# Patient Record
Sex: MALE | Race: WHITE | NOT HISPANIC OR LATINO | Employment: STUDENT | URBAN - NONMETROPOLITAN AREA
[De-identification: names, ages, dates, MRNs, and addresses within clinical notes are randomized per-mention and may not be internally consistent; named-entity substitution may affect disease eponyms.]

---

## 2017-01-27 ENCOUNTER — OFFICE VISIT (OUTPATIENT)
Dept: ORTHOPEDIC SURGERY | Facility: CLINIC | Age: 32
End: 2017-01-27

## 2017-01-27 VITALS — BODY MASS INDEX: 37.67 KG/M2 | HEIGHT: 67 IN | WEIGHT: 240 LBS

## 2017-01-27 DIAGNOSIS — M94.8X1 OTHER SPECIFIED DISORDERS OF CARTILAGE, SHOULDER: ICD-10-CM

## 2017-01-27 DIAGNOSIS — M75.101 ROTATOR CUFF SYNDROME OF RIGHT SHOULDER: Primary | ICD-10-CM

## 2017-01-27 PROCEDURE — 99213 OFFICE O/P EST LOW 20 MIN: CPT | Performed by: ORTHOPAEDIC SURGERY

## 2017-01-27 NOTE — MR AVS SNAPSHOT
Ricardo Avila   1/27/2017 10:10 AM   Office Visit    Dept Phone:  [phone]   Encounter #:  [id number]    Provider:  Isidro Bowens MD   Department:  [facility]                Your Full Care Plan              Your Updated Medication List          This list is accurate as of: 1/27/17 10:50 AM.  Always use your most recent med list.                * albuterol 108 (90 BASE) MCG/ACT inhaler   Commonly known as:  PROVENTIL HFA;VENTOLIN HFA   Inhale 2 puffs Every 6 (Six) Hours As Needed for wheezing or shortness of air.       * albuterol (2.5 MG/3ML) 0.083% nebulizer solution   Commonly known as:  PROVENTIL   Take 2.5 mg by nebulization Every 4 (Four) Hours As Needed for wheezing.       azithromycin 250 MG tablet   Commonly known as:  ZITHROMAX Z-HANS   Take 2 tablets the first day, then 1 tablet daily for 4 days.       naproxen 500 MG tablet   Commonly known as:  NAPROSYN       promethazine-dextromethorphan 6.25-15 MG/5ML syrup   Commonly known as:  PROMETHAZINE-DM   1-2 tsp po QHS prn cough       * Notice:  This list has 2 medication(s) that are the same as other medications prescribed for you. Read the directions carefully, and ask your doctor or other care provider to review them with you.            You Were Diagnosed With        Codes Comments    Rotator cuff syndrome of right shoulder    -  Primary ICD-10-CM: M75.101  ICD-9-CM: 726.10     Other specified disorders of cartilage, shoulder     ICD-10-CM: M94.8X1  ICD-9-CM: 733.99       Instructions     None    Patient Instructions History      Upcoming Appointments     Visit Type Date Time Department    FOLLOW UP 1/27/2017 10:10 AM [facility]      [employer] Signup     Our records indicate that you have an active [employer] account.    You can view your After Visit Summary by going to [email or web address] and logging in with your [employer] username and password.  If you  "don't have a [employer] username and password but a parent or guardian has access to your record, the parent or guardian should login with their own [employer] username and password and access your record to view the After Visit Summary.    If you have questions, you can email [email or web address] or call [phone] to talk to our [employer] staff.  Remember, [employer] is NOT to be used for urgent needs.  For medical emergencies, dial 911.               Other Info from Your Visit           Allergies     No Known Allergies      Reason for Visit     Right Shoulder - Follow-up           Vital Signs     Height Weight Body Mass Index Smoking Status          67\" (170.2 cm) 240 lb (109 kg) 37.59 kg/m2 Former Smoker        Problems and Diagnoses Noted     Other specified disorders of cartilage, shoulder    Rotator cuff syndrome        "

## 2017-01-27 NOTE — PROGRESS NOTES
"Ricardo Avila is a 31 y.o. male returns for     Chief Complaint   Patient presents with   • Right Shoulder - Follow-up          CONCURRENT MEDICAL HISTORY: patient states that he is having more pain in shoulder that started about one month ago while at work he felt something \"tear\" in shoulder.     States he felt a pop in his shoulder about one month ago.  Worsening pain, with occasional popping in the right shoulder.  States he has not been able to lift the shoulder since the injury.    Past Medical History   Diagnosis Date   • Bronchitis, chronic    • Heart murmur    • History of ear infections    • History of strep sore throat    • Sinusitis        No Known Allergies      Current Outpatient Prescriptions:   •  albuterol (PROVENTIL HFA;VENTOLIN HFA) 108 (90 BASE) MCG/ACT inhaler, Inhale 2 puffs Every 6 (Six) Hours As Needed for wheezing or shortness of air., Disp: 18 g, Rfl: 0  •  albuterol (PROVENTIL) (2.5 MG/3ML) 0.083% nebulizer solution, Take 2.5 mg by nebulization Every 4 (Four) Hours As Needed for wheezing., Disp: 75 mL, Rfl: 0  •  naproxen (NAPROSYN) 500 MG tablet, Take 500 mg by mouth 2 (Two) Times a Day With Meals., Disp: , Rfl:   •  azithromycin (ZITHROMAX Z-HANS) 250 MG tablet, Take 2 tablets the first day, then 1 tablet daily for 4 days., Disp: 6 tablet, Rfl: 0  •  promethazine-dextromethorphan (PROMETHAZINE-DM) 6.25-15 MG/5ML syrup, 1-2 tsp po QHS prn cough, Disp: 120 mL, Rfl: 0    Past Surgical History   Procedure Laterality Date   • Shoulder surgery Right        ROS  No fevers or chills.  No chest pain or shortness of air.  No GI or  disturbances.    PHYSICAL EXAMINATION:       Visit Vitals   • Ht 67\" (170.2 cm)   • Wt 240 lb (109 kg)   • BMI 37.59 kg/m2       Physical Exam    GAIT:     [x]  Normal  []  Antalgic    Assistive device: [x]  None  []  Walker     []  Crutches  []  Cane     []  Wheelchair  []  Stretcher    Right Shoulder Exam     Tenderness   The patient is experiencing tenderness in " the acromion.    Range of Motion   Active Abduction: 120   Passive Abduction: 150   Forward Flexion: 150     Tests   Apprehension: negative  Hawkin's test: negative  Impingement: negative    Other   Erythema: absent  Scars: present  Sensation: normal  Pulse: present        pain overlying the acromion laterally.      No results found.          ASSESSMENT:    Diagnoses and all orders for this visit:    Rotator cuff syndrome of right shoulder  -     MRI shoulder right wo contrast; Future  -     FL Arthrogram Shoulder Right; Future    Other specified disorders of cartilage, shoulder  -     MRI shoulder right wo contrast; Future  -     FL Arthrogram Shoulder Right; Future        PLAN    Repeat MRI of the right shoulder with arthrogram.        Isidro Bowens MD

## 2017-03-10 ENCOUNTER — OFFICE VISIT (OUTPATIENT)
Dept: ORTHOPEDIC SURGERY | Facility: CLINIC | Age: 32
End: 2017-03-10

## 2017-03-10 VITALS — BODY MASS INDEX: 37.67 KG/M2 | HEIGHT: 67 IN | WEIGHT: 240 LBS

## 2017-03-10 DIAGNOSIS — S43.431A SLAP LESION OF RIGHT SHOULDER: ICD-10-CM

## 2017-03-10 DIAGNOSIS — M94.8X1 OTHER SPECIFIED DISORDERS OF CARTILAGE, SHOULDER: ICD-10-CM

## 2017-03-10 DIAGNOSIS — M75.101 ROTATOR CUFF SYNDROME OF RIGHT SHOULDER: Primary | ICD-10-CM

## 2017-03-10 PROCEDURE — 99213 OFFICE O/P EST LOW 20 MIN: CPT | Performed by: ORTHOPAEDIC SURGERY

## 2017-03-10 RX ORDER — NABUMETONE 750 MG/1
750 TABLET, FILM COATED ORAL 2 TIMES DAILY
Qty: 60 TABLET | Refills: 2 | Status: SHIPPED | OUTPATIENT
Start: 2017-03-10 | End: 2018-10-05

## 2017-03-10 NOTE — PROGRESS NOTES
"Ricardo Avila is a 31 y.o. 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          CONCURRENT MEDICAL HISTORY:    Past Medical History   Diagnosis Date   • Bronchitis, chronic    • Heart murmur    • History of ear infections    • History of strep sore throat    • Sinusitis        No Known Allergies      Current Outpatient Prescriptions:   •  albuterol (PROVENTIL HFA;VENTOLIN HFA) 108 (90 BASE) MCG/ACT inhaler, Inhale 2 puffs Every 6 (Six) Hours As Needed for wheezing or shortness of air., Disp: 18 g, Rfl: 0  •  albuterol (PROVENTIL) (2.5 MG/3ML) 0.083% nebulizer solution, Take 2.5 mg by nebulization Every 4 (Four) Hours As Needed for wheezing., Disp: 75 mL, Rfl: 0  •  azithromycin (ZITHROMAX Z-HANS) 250 MG tablet, Take 2 tablets the first day, then 1 tablet daily for 4 days., Disp: 6 tablet, Rfl: 0  •  nabumetone (RELAFEN) 750 MG tablet, Take 1 tablet by mouth 2 (Two) Times a Day., Disp: 60 tablet, Rfl: 2  •  naproxen (NAPROSYN) 500 MG tablet, Take 500 mg by mouth 2 (Two) Times a Day With Meals., Disp: , Rfl:   •  promethazine-dextromethorphan (PROMETHAZINE-DM) 6.25-15 MG/5ML syrup, 1-2 tsp po QHS prn cough, Disp: 120 mL, Rfl: 0    Past Surgical History   Procedure Laterality Date   • Shoulder surgery Right        ROS  No fevers or chills.  No chest pain or shortness of air.  No GI or  disturbances.    PHYSICAL EXAMINATION:       Visit Vitals   • Ht 67\" (170.2 cm)   • Wt 240 lb (109 kg)   • BMI 37.59 kg/m2       Physical Exam    GAIT:     []  Normal  []  Antalgic    Assistive device: []  None  []  Walker     []  Crutches  []  Cane     []  Wheelchair  []  Stretcher    Right Shoulder Exam     Tenderness   The patient is experiencing " tenderness in the acromioclavicular joint.    Range of Motion   Active Abduction: 150   Passive Abduction: 150   External Rotation: 30     Muscle Strength   Abduction: 5/5   Internal Rotation: 5/5   External Rotation: 5/5   Supraspinatus: 5/5   Biceps: 5/5     Tests   Apprehension: negative  Hawkin's test: negative    Other   Erythema: absent  Scars: absent  Sensation: normal  Pulse: present    Comments:  No swelling, no deformity              MRI shoulder right arthrogram with contrast2/10/2017  [facility]  Result Impression       1.  No labral abnormalities.    2.  Mild supraspinatus tendinosis.   Result Narrative   Indication:  Pain with decreased range of motion for 2-3 weeks; rotator cuff tear in the past.    MRI, Right Shoulder following Arthrogram:  No marrow edema or sign of bone injury is seen.  There is discrete subchondral signal change in the greater tuberosity that is probably from old injury.  The rotator cuff is not torn.  The supraspinatus tendon   is mildly thickened and has increased signal on T1-weighted images.  No impingement.  The labrum is intact.  No loose bodies.               ASSESSMENT:    There are no diagnoses linked to this encounter.      PLAN    Reviewed MRI findings.  Unclear what the cause of persistent symptoms is at this point.  Recommend 2nd opinion.  I discussed possibly returning to surgery for a repeat arthroscopy, and will withold making a decision until further time passes.    Will try relafen for inflammatory symptoms.    Isidro Bowens MD

## 2017-03-21 ENCOUNTER — TRANSCRIBE ORDERS (OUTPATIENT)
Dept: PHYSICAL THERAPY | Facility: HOSPITAL | Age: 32
End: 2017-03-21

## 2017-03-21 DIAGNOSIS — S16.1XXA NECK STRAIN, INITIAL ENCOUNTER: Primary | ICD-10-CM

## 2017-03-24 ENCOUNTER — APPOINTMENT (OUTPATIENT)
Dept: PHYSICAL THERAPY | Facility: HOSPITAL | Age: 32
End: 2017-03-24

## 2017-03-29 ENCOUNTER — APPOINTMENT (OUTPATIENT)
Dept: PHYSICAL THERAPY | Facility: HOSPITAL | Age: 32
End: 2017-03-29

## 2017-03-29 ENCOUNTER — HOSPITAL ENCOUNTER (OUTPATIENT)
Dept: PHYSICAL THERAPY | Facility: HOSPITAL | Age: 32
Setting detail: THERAPIES SERIES
Discharge: HOME | End: 2017-03-29

## 2017-03-29 DIAGNOSIS — S16.1XXA NECK STRAIN, INITIAL ENCOUNTER: Primary | ICD-10-CM

## 2017-03-29 DIAGNOSIS — M54.2 CERVICALGIA: ICD-10-CM

## 2017-03-29 PROCEDURE — 97110 THERAPEUTIC EXERCISES: CPT | Performed by: PHYSICAL THERAPIST

## 2017-03-29 PROCEDURE — 97162 PT EVAL MOD COMPLEX 30 MIN: CPT | Performed by: PHYSICAL THERAPIST

## 2017-03-29 PROCEDURE — G0283 ELEC STIM OTHER THAN WOUND: HCPCS | Performed by: PHYSICAL THERAPIST
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
## 2017-03-31 ENCOUNTER — HOSPITAL ENCOUNTER (OUTPATIENT)
Dept: PHYSICAL THERAPY | Facility: HOSPITAL | Age: 32
Setting detail: THERAPIES SERIES
Discharge: HOME | End: 2017-03-31

## 2017-03-31 DIAGNOSIS — S16.1XXA NECK STRAIN, INITIAL ENCOUNTER: Primary | ICD-10-CM

## 2017-03-31 DIAGNOSIS — M54.2 CERVICALGIA: ICD-10-CM

## 2017-03-31 PROCEDURE — G0283 ELEC STIM OTHER THAN WOUND: HCPCS

## 2017-03-31 PROCEDURE — 97110 THERAPEUTIC EXERCISES: CPT

## 2017-04-04 ENCOUNTER — HOSPITAL ENCOUNTER (OUTPATIENT)
Dept: PHYSICAL THERAPY | Facility: HOSPITAL | Age: 32
Setting detail: THERAPIES SERIES
Discharge: HOME | End: 2017-04-04

## 2017-04-04 DIAGNOSIS — M54.2 CERVICALGIA: ICD-10-CM

## 2017-04-04 DIAGNOSIS — S16.1XXA NECK STRAIN, INITIAL ENCOUNTER: Primary | ICD-10-CM

## 2017-04-04 PROCEDURE — 97110 THERAPEUTIC EXERCISES: CPT | Performed by: PHYSICAL THERAPY ASSISTANT

## 2017-04-04 NOTE — PROGRESS NOTES
Outpatient Physical Therapy Ortho Treatment Note  [facility]     Patient Name: Ricardo Avila  : 1985  MRN: [id number]  Today's Date: 2017      Visit Date: 2017    Visits 3/3   Recert Date 17   MD appointment 17   Pt reports  30% improvement       Visit Dx:    ICD-10-CM ICD-9-CM   1. Neck strain, initial encounter S16.1XXA 847.0   2. Cervicalgia M54.2 723.1       Patient Active Problem List   Diagnosis   • Rotator cuff syndrome of right shoulder   • Other specified disorders of cartilage, shoulder   • SLAP lesion of right shoulder        Past Medical History:   Diagnosis Date   • Bronchitis, chronic    • Heart murmur    • History of ear infections    • History of strep sore throat    • Sinusitis         Past Surgical History:   Procedure Laterality Date   • SHOULDER SURGERY Right              PT Ortho       17 0800    Neck    Flexion AROM --   48°  -JH    Extension AROM --   29°  -JH    Left Lateral Flexion AROM --   20°  -JH    Right Lateral Flexion AROM --   12°  -JH    Left Rotation AROM --   50°  -JH    Right Rotation AROM --   40°  -JH      User Key  (r) = Recorded By, (t) = Taken By, (c) = Cosigned By    Initials Name Provider Type    ZORAN Davis PTA Physical Therapy Assistant                            PT Assessment/Plan       17 0900       PT Assessment    Assessment Comments pt had increased discomfort with scap squeeze but neha other TE well, TTP @ R UT to shoulder  -     PT Plan    PT Frequency 3x/week;2x/week   3x a week for 2 weeks then 2x a week after that  -     PT Plan Comments try scap squeeze again increase as neha  -JH       User Key  (r) = Recorded By, (t) = Taken By, (c) = Cosigned By    Initials Name Provider Type    ZORAN Davis PTA Physical Therapy Assistant                Modalities       17 0800          Moist Heat    Patient denies application of MH Yes  -JH        User Key  (r) = Recorded By, (t) =  Taken By, (c) = Cosigned By    Initials Name Provider Type    ZORAN Davis PTA Physical Therapy Assistant                Exercises       04/04/17 0800          Subjective Comments    Subjective Comments Pt reports he woke up and had some pain, pt states he slept in a recliner last night  -      Subjective Pain    Able to rate subjective pain? yes  -      Pre-Treatment Pain Level 2  -      Post-Treatment Pain Level 3  -      Exercise 1    Exercise Name 1 Pro II UE F/R  -      Resistance 1 --   L 5.0  -JH      Time (Minutes) 1 10  -JH      Exercise 2    Exercise Name 2 B UT S  -JH      Reps 2 2  -JH      Time (Seconds) 2 30  -JH      Exercise 3    Exercise Name 3 B LS S  -JH      Reps 3 2  -JH      Time (Seconds) 3 30  -JH      Exercise 4    Exercise Name 4 elephant stretch  -JH      Reps 4 2  -JH      Time (Seconds) 4 30  -JH      Exercise 5    Exercise Name 5 posterior shoulder rolls  -JH      Sets 5 2  -JH      Reps 5 10  -JH      Exercise 6    Exercise Name 6 chin tucks  -      Sets 6 2  -JH      Reps 6 10  -JH      Time (Seconds) 6 5 sec hold  -JH      Exercise 7    Exercise Name 7 tennis ball occipital release  -JH      Time (Minutes) 7 5  -JH      Exercise 8    Exercise Name 8 no money  -JH      Reps 8 20  -JH      Exercise 9    Exercise Name 9 C-spine ISO 6 way  -JH      Reps 9 10  -JH      Time (Seconds) 9 5 sec  -JH        User Key  (r) = Recorded By, (t) = Taken By, (c) = Cosigned By    Initials Name Provider Type    ZORAN Davis PTA Physical Therapy Assistant                               PT OP Goals       04/04/17 0800       PT Short Term Goals    STG Date to Achieve 04/19/17  -     STG 1 I with HEP and have additions/changes by next recertification.  -     STG 1 Progress Progressing;Ongoing  -     STG 2 AROM cervical flewxion/extension to 45° each.  -     STG 2 Progress Progressing;Ongoing  -     STG 3 AROM B cervical SB 30°.  -     STG 3 Progress Progressing;Ongoing   -     STG 4 AROM cervical ROT 65° B.  -     STG 4 Progress Progressing;Ongoing  -     STG 5 Cervical spine MMT 4/5 or greater in all directions.  -     STG 5 Progress Progressing;Ongoing  -     Long Term Goals    LTG Date to Achieve 05/03/17  -     LTG 1 AROM cervical flexion/extension 55° or greater.  -     LTG 1 Progress Ongoing  -     LTG 2 AROM cervical SB 40° B.  -     LTG 2 Progress Ongoing  -     LTG 3 AROM cervical B ROT 75° or greater.  -     LTG 3 Progress Ongoing  -     LTG 4 Cervical spine 5/5 in all directions.  -     LTG 4 Progress Ongoing  -     LTG 5 Negative quadrant B for c-spine.  -     LTG 5 Progress Ongoing  -     LTG 6 NDI score of less than 20%.  -     LTG 6 Progress Ongoing  -     LTG 7 I with final HEP.  -     LTG 7 Progress Ongoing  -     LTG 8 D/C with final HEP and free 30 day fitness formula membership.  -     LTG 8 Progress Not Met  -     Time Calculation    PT Goal Re-Cert Due Date 04/19/17  -       User Key  (r) = Recorded By, (t) = Taken By, (c) = Cosigned By    Initials Name Provider Type     Milind Davis PTA Physical Therapy Assistant                    Time Calculation:   Start Time: 0845  Stop Time: 0930  Time Calculation (min): 45 min    Therapy Charges for Today     Code Description Service Date Service Provider Modifiers Qty    54631233019 HC PT THER PROC EA 15 MIN 4/4/2017 Milind Davis PTA GP 3                    Milind Davis PTA  4/4/2017

## 2017-04-05 ENCOUNTER — HOSPITAL ENCOUNTER (OUTPATIENT)
Dept: PHYSICAL THERAPY | Facility: HOSPITAL | Age: 32
Setting detail: THERAPIES SERIES
Discharge: HOME | End: 2017-04-05

## 2017-04-05 DIAGNOSIS — S16.1XXA NECK STRAIN, INITIAL ENCOUNTER: Primary | ICD-10-CM

## 2017-04-05 DIAGNOSIS — M54.2 CERVICALGIA: ICD-10-CM

## 2017-04-05 PROCEDURE — G0283 ELEC STIM OTHER THAN WOUND: HCPCS

## 2017-04-05 PROCEDURE — 97110 THERAPEUTIC EXERCISES: CPT

## 2017-04-05 NOTE — PROGRESS NOTES
Outpatient Physical Therapy Ortho Treatment Note  [facility]  Misty Panchal PTA       Patient Name: Ricardo Avila  : 1985  MRN: [id number]  Today's Date: 2017      Visit Date: 2017     Visits:   Insurance Visits Approved: 12 visits  Recert Due: 2017  MD Appt: 2017  Pain: pretreatment 3/10; post treatment 0/10  Improvement: pt is subjectively reporting 40% improvement since initial evaluation    Visit Dx:    ICD-10-CM ICD-9-CM   1. Neck strain, initial encounter S16.1XXA 847.0   2. Cervicalgia M54.2 723.1       Patient Active Problem List   Diagnosis   • Rotator cuff syndrome of right shoulder   • Other specified disorders of cartilage, shoulder   • SLAP lesion of right shoulder        Past Medical History:   Diagnosis Date   • Bronchitis, chronic    • Heart murmur    • History of ear infections    • History of strep sore throat    • Sinusitis         Past Surgical History:   Procedure Laterality Date   • SHOULDER SURGERY Right              PT Ortho       17 0800    Subjective Comments    Subjective Comments states that he's having a little pain today.   -    Subjective Pain    Able to rate subjective pain? yes  -    Pre-Treatment Pain Level 3  -    Post-Treatment Pain Level 0  -    Posture/Observations    Posture/Observations Comments no acute distress. presents with appropriate posture.   -      17 0800    Neck    Flexion AROM --   48°  -JH    Extension AROM --   29°  -JH    Left Lateral Flexion AROM --   20°  -JH    Right Lateral Flexion AROM --   12°  -JH    Left Rotation AROM --   50°  -JH    Right Rotation AROM --   40°  -JH      User Key  (r) = Recorded By, (t) = Taken By, (c) = Cosigned By    Initials Name Provider Type     Misty Panchal PTA Physical Therapy Assistant     Milind Davis PTA Physical Therapy Assistant                            PT Assessment/Plan       17 0800       PT Assessment    Assessment  Comments good performance with therex. responds well to scap trigger point release with tennis ball.   -     PT Plan    PT Frequency 3x/week;2x/week   3x for 2 weeks, then 2x after  -     PT Plan Comments next visit wall push ups  -       User Key  (r) = Recorded By, (t) = Taken By, (c) = Cosigned By    Initials Name Provider Type     Misty MOISE OLGA Panchal Physical Therapy Assistant                Modalities       04/05/17 0800          Moist Heat    MH Applied Yes  -      Location Cervical spine  -[facility] S/P Rx Yes  -      ELECTRICAL STIMULATION    Attended/Unattended Unattended  -      Stimulation Type IFC  -      Location/Electrode Placement/Other Posterior cervical spine  -      Rx Minutes 20 mins  -        User Key  (r) = Recorded By, (t) = Taken By, (c) = Cosigned By    Initials Name Provider Type     Misty MOISE OLGA Panchal Physical Therapy Assistant                Exercises       04/05/17 0800          Subjective Comments    Subjective Comments states that he's having a little pain today.   -      Subjective Pain    Able to rate subjective pain? yes  -      Pre-Treatment Pain Level 3  -      Post-Treatment Pain Level 0  -      Exercise 1    Exercise Name 1 Pro II UE F/R  -MH      Resistance 1 --   L 5.0  -MH      Time (Minutes) 1 10  -MH      Exercise 2    Exercise Name 2 B UT S  -MH      Reps 2 2  -MH      Time (Seconds) 2 30  -MH      Exercise 3    Exercise Name 3 B LS S  -MH      Reps 3 2  -MH      Time (Seconds) 3 30  -MH      Exercise 4    Exercise Name 4 Elephant S  -MH      Reps 4 2  -MH      Time (Seconds) 4 30  -MH      Exercise 5    Exercise Name 5 posterior shoulder rolls  -MH      Reps 5 --   PRN B/W therex  -MH      Exercise 6    Exercise Name 6 Chin Tucks  -MH      Sets 6 2  -MH      Reps 6 10  -MH      Time (Seconds) 6 5 sec hold  -MH      Exercise 7    Exercise Name 7 C-Spine ISO 6 way  -MH      Reps 7 20  -MH      Time (Seconds) 7 5 sec hold  -MH      Exercise 8     Exercise Name 8 No Money   -MH      Equipment 8 Theraband  -MH      Resistance 8 Green  -MH      Reps 8 20  -MH      Exercise 9    Exercise Name 9 Tband Rows Mid/Low  -MH      Equipment 9 Theraband  -MH      Resistance 9 Green  -MH      Reps 9 20  -MH      Exercise 10    Exercise Name 10 Tband Bilateral Shld Ext  -MH      Equipment 10 Theraband  -MH      Resistance 10 Green  -MH      Reps 10 20  -MH      Exercise 11    Exercise Name 11 Tennis Ball Trigger Point at Scapula  -      Time (Minutes) 11 3 minutes  -      Exercise 12    Exercise Name 12 Tennis Ball Suboccipital Release  -      Time (Minutes) 12 5 minutes  -        User Key  (r) = Recorded By, (t) = Taken By, (c) = Cosigned By    Initials Name Provider Type     Misty Panchal, PTA Physical Therapy Assistant                               PT OP Goals       04/05/17 0800       PT Short Term Goals    STG Date to Achieve 04/19/17  -     STG 1 I with HEP and have additions/changes by next recertification.  -     STG 1 Progress Met  -     STG 2 AROM cervical flewxion/extension to 45° each.  -     STG 2 Progress Progressing;Ongoing  -     STG 3 AROM B cervical SB 30°.  -     STG 3 Progress Progressing;Ongoing  -     STG 4 AROM cervical ROT 65° B.  -     STG 4 Progress Progressing;Ongoing  -     STG 5 Cervical spine MMT 4/5 or greater in all directions.  -     STG 5 Progress Progressing;Ongoing  -     Long Term Goals    LTG Date to Achieve 05/03/17  -     LTG 1 AROM cervical flexion/extension 55° or greater.  -     LTG 1 Progress Ongoing  -     LTG 2 AROM cervical SB 40° B.  -     LTG 2 Progress Ongoing  -     LTG 3 AROM cervical B ROT 75° or greater.  -     LTG 3 Progress Ongoing  -     LTG 4 Cervical spine 5/5 in all directions.  -     LTG 4 Progress Ongoing  -     LTG 5 Negative quadrant B for c-spine.  -     LTG 5 Progress Ongoing  -     LTG 6 NDI score of less than 20%.  -     LTG 6 Progress Ongoing  -      LTG 7 I with final HEP.  -     LTG 7 Progress Ongoing  -     LTG 8 D/C with final HEP and free 30 day fitness formula membership.  -     LTG 8 Progress Not Met  -     Time Calculation    PT Goal Re-Cert Due Date 04/19/17  -       User Key  (r) = Recorded By, (t) = Taken By, (c) = Cosigned By    Initials Name Provider Type     Misty Panchal PTA Physical Therapy Assistant                Therapy Education       04/05/17 0800          Therapy Education    Given HEP;Symptoms/condition management;Pain management;Posture/body mechanics  -      Program Progressed   tband no money,rows,shld ext; tennis ball trigger pt release  -      How Provided Verbal;Demonstration;Written  -      Provided to Patient  -      Level of Understanding Verbalized  -        User Key  (r) = Recorded By, (t) = Taken By, (c) = Cosigned By    Initials Name Provider Type     Misty Panchal PTA Physical Therapy Assistant                Time Calculation:   Start Time: 0800  Stop Time: 0938  Time Calculation (min): 98 min  Total Timed Code Minutes- PT: 78 minute(s)    Therapy Charges for Today     Code Description Service Date Service Provider Modifiers Qty    85931288795 HC PT THER SUPP EA 15 MIN 4/5/2017 Misty Panchal, PTA GP 1    58115315693 HC PT THER PROC EA 15 MIN 4/5/2017 Misty Panchal, PTA GP 5    98039838986 HC PT ELECTRICAL STIM UNATTENDED 4/5/2017 Misty Panchal, PTA  1                    Misty Panchal PTA  4/5/2017

## 2017-04-07 ENCOUNTER — HOSPITAL ENCOUNTER (OUTPATIENT)
Dept: PHYSICAL THERAPY | Facility: HOSPITAL | Age: 32
Setting detail: THERAPIES SERIES
Discharge: HOME | End: 2017-04-07

## 2017-04-07 DIAGNOSIS — M54.2 CERVICALGIA: ICD-10-CM

## 2017-04-07 DIAGNOSIS — S16.1XXA NECK STRAIN, INITIAL ENCOUNTER: Primary | ICD-10-CM

## 2017-04-07 PROCEDURE — G0283 ELEC STIM OTHER THAN WOUND: HCPCS | Performed by: SPECIALIST/TECHNOLOGIST

## 2017-04-07 PROCEDURE — 97110 THERAPEUTIC EXERCISES: CPT | Performed by: SPECIALIST/TECHNOLOGIST

## 2017-04-07 NOTE — PROGRESS NOTES
Outpatient Physical Therapy Ortho Treatment Note  [facility]  Patient Name: Ricardo Avila  : 1985  MRN: [id number]  Today's Date: 2017      Visit Date: 2017   Patients reports pain as:  1-2/10   Patient reports overall % improvement as:  40%   Patients attendance has been:  5/5   Insurance visits available  12 visits   Recert Date is:  17   Next MD visit is:  17       Visit Dx:    ICD-10-CM ICD-9-CM   1. Neck strain, initial encounter S16.1XXA 847.0   2. Cervicalgia M54.2 723.1       Patient Active Problem List   Diagnosis   • Rotator cuff syndrome of right shoulder   • Other specified disorders of cartilage, shoulder   • SLAP lesion of right shoulder        Past Medical History:   Diagnosis Date   • Bronchitis, chronic    • Heart murmur    • History of ear infections    • History of strep sore throat    • Sinusitis         Past Surgical History:   Procedure Laterality Date   • SHOULDER SURGERY Right 2016             PT Ortho       17 0800    Subjective Comments    Subjective Comments states that he's having a little pain today.   -    Subjective Pain    Able to rate subjective pain? yes  -    Pre-Treatment Pain Level 3  -    Post-Treatment Pain Level 0  -    Posture/Observations    Posture/Observations Comments no acute distress. presents with appropriate posture.   -      User Key  (r) = Recorded By, (t) = Taken By, (c) = Cosigned By    Initials Name Provider Type     Misty Panchal, PTA Physical Therapy Assistant                            PT Assessment/Plan       17 0800       PT Assessment    Assessment Comments (P)  neha rx. well.  able to add tband exer and wall push ups.  no c/o.  sub occip w/ tn ball felt good  -ML     PT Plan    PT Frequency (P)  2x/week  -ML     PT Plan Comments (P)  progress stab.   -ML       User Key  (r) = Recorded By, (t) = Taken By, (c) = Cosigned By    Initials Name Provider Type     Kevin Alan, ATC                  Modalities       04/07/17 0800          Moist Heat    MH Applied (P)  Yes  -ML      Location (P)  cervical  -ML      MH S/P Rx (P)  Yes  -ML      ELECTRICAL STIMULATION    Attended/Unattended (P)  Unattended  -ML      Stimulation Type (P)  IFC  -ML      Location/Electrode Placement/Other (P)  posterior cervical/ UT  -ML      Rx Minutes (P)  20 mins  -ML        User Key  (r) = Recorded By, (t) = Taken By, (c) = Cosigned By    Initials Name Provider Type    ML Kevin Alan ATC                 Exercises       04/07/17 0800          Subjective Comments    Subjective Comments (P)  rough nite w/ pain.    headaches and pain.  took tylenol this am. better now  -ML      Subjective Pain    Able to rate subjective pain? (P)  yes  -ML      Pre-Treatment Pain Level (P)  2  -ML      Post-Treatment Pain Level (P)  0  -ML      Exercise 1    Exercise Name 1 (P)  pro2 UE f/r  -ML      Resistance 1 (P)  --   L6.0  -ML      Time (Minutes) 1 (P)  10 min  -ML      Exercise 2    Exercise Name 2 (P)  B-UT S  -ML      Reps 2 (P)  2  -ML      Time (Seconds) 2 (P)  30 sec  -ML      Exercise 3    Exercise Name 3 (P)  B- Lev S  -ML      Reps 3 (P)  2  -ML      Time (Seconds) 3 (P)  30 sec  -ML      Exercise 4    Exercise Name 4 (P)  Elephant S  -ML      Reps 4 (P)  2  -ML      Time (Seconds) 4 (P)  30 sec  -ML      Exercise 5    Exercise Name 5 (P)  posterior shoulder rolls  -ML      Reps 5 (P)  --   PRN B/W therex  -ML      Exercise 6    Exercise Name 6 (P)  Chin Tucks  -ML      Sets 6 (P)  2  -ML      Reps 6 (P)  10  -ML      Exercise 7    Exercise Name 7 (P)  B-Horiz abd/add  -ML      Equipment 7 (P)  Theraband  -ML      Resistance 7 (P)  Green  -ML      Reps 7 (P)  20  -ML      Exercise 8    Exercise Name 8 (P)  No Money   -ML      Equipment 8 (P)  Theraband  -ML      Resistance 8 (P)  Green  -ML      Reps 8 (P)  20  -ML      Exercise 9    Exercise Name 9 (P)  Tband Rows Mid/Low  -ML       Equipment 9 (P)  Theraband  -ML      Resistance 9 (P)  Green  -ML      Reps 9 (P)  20  -ML      Exercise 10    Exercise Name 10 (P)  Tband Bilateral Shld Ext  -ML      Equipment 10 (P)  Theraband  -ML      Resistance 10 (P)  Green  -ML      Reps 10 (P)  20  -ML      Exercise 11    Exercise Name 11 (P)  Wall push ups  -ML      Sets 11 (P)  2  -ML      Reps 11 (P)  10  -ML      Exercise 12    Exercise Name 12 (P)  Tennis Ball Suboccipital Release  -ML      Time (Minutes) 12 (P)  5 minutes  -ML        User Key  (r) = Recorded By, (t) = Taken By, (c) = Cosigned By    Initials Name Provider Type    ML Kevin Alan, ATC                                PT OP Goals       04/07/17 0800       PT Short Term Goals    STG Date to Achieve (P)  04/19/17  -ML     STG 1 (P)  I with HEP and have additions/changes by next recertification.  -ML     STG 1 Progress (P)  Met  -ML     STG 2 (P)  AROM cervical flewxion/extension to 45° each.  -ML     STG 2 Progress (P)  Progressing;Ongoing  -ML     STG 3 (P)  AROM B cervical SB 30°.  -ML     STG 3 Progress (P)  Progressing;Ongoing  -ML     STG 4 (P)  AROM cervical ROT 65° B.  -ML     STG 4 Progress (P)  Progressing;Ongoing  -ML     STG 5 (P)  Cervical spine MMT 4/5 or greater in all directions.  -ML     STG 5 Progress (P)  Progressing;Ongoing  -ML     Long Term Goals    LTG Date to Achieve (P)  05/03/17  -ML     LTG 1 (P)  AROM cervical flexion/extension 55° or greater.  -ML     LTG 1 Progress (P)  Ongoing  -ML     LTG 2 (P)  AROM cervical SB 40° B.  -ML     LTG 2 Progress (P)  Ongoing  -ML     LTG 3 (P)  AROM cervical B ROT 75° or greater.  -ML     LTG 3 Progress (P)  Ongoing  -ML     LTG 4 (P)  Cervical spine 5/5 in all directions.  -ML     LTG 4 Progress (P)  Ongoing  -ML     LTG 5 (P)  Negative quadrant B for c-spine.  -ML     LTG 5 Progress (P)  Ongoing  -ML     LTG 6 (P)  NDI score of less than 20%.  -ML     LTG 6 Progress (P)  Ongoing  -ML     LTG 7 (P)  I with  final HEP.  -ML     LTG 7 Progress (P)  Ongoing  -ML     LTG 8 (P)  D/C with final HEP and free 30 day fitness formula membership.  -ML     LTG 8 Progress (P)  Not Met  -ML     Time Calculation    PT Goal Re-Cert Due Date (P)  04/19/17  -ML       User Key  (r) = Recorded By, (t) = Taken By, (c) = Cosigned By    Initials Name Provider Type     Kevin Alan, ATC                     Time Calculation:   Start Time: (P) 0805  Stop Time: (P) 0906  Time Calculation (min): (P) 61 min  Total Timed Code Minutes- PT: (P) 39 minute(s)    Therapy Charges for Today     Code Description Service Date Service Provider Modifiers Qty    41459714552 HC PT THER SUPP EA 15 MIN 4/7/2017 Kevin Alan, ATC  1    05242637518 HC PT THER PROC EA 15 MIN 4/7/2017 Kevin Alan, ATC  2    08916770618 HC PT ELECTRICAL STIM UNATTENDED 4/7/2017 Kevin Alan, ATC  1    08088578336 HC PT THER PROC EA 15 MIN 4/7/2017 Kevin Alan, ATC  1                    Kevin Alan, ATC  4/7/2017

## 2017-04-11 ENCOUNTER — HOSPITAL ENCOUNTER (OUTPATIENT)
Dept: PHYSICAL THERAPY | Facility: HOSPITAL | Age: 32
Setting detail: THERAPIES SERIES
Discharge: HOME | End: 2017-04-11

## 2017-04-11 DIAGNOSIS — S16.1XXA NECK STRAIN, INITIAL ENCOUNTER: Primary | ICD-10-CM

## 2017-04-11 DIAGNOSIS — M54.2 CERVICALGIA: ICD-10-CM

## 2017-04-11 PROCEDURE — 97110 THERAPEUTIC EXERCISES: CPT

## 2017-04-11 PROCEDURE — G0283 ELEC STIM OTHER THAN WOUND: HCPCS

## 2017-04-11 NOTE — PROGRESS NOTES
Outpatient Physical Therapy Ortho Treatment Note  [facility]     Patient Name: Ricardo Avila  : 1985  MRN: [id number]  Today's Date: 2017      Visit Date: 2017   Pt's pre tx pain 1/10 post tx pain  0/10.    Pt reports 45% improvement.   / visits w/ 12 visits approved.   Next recert 2017.    MD appt 17    Visit Dx:    ICD-10-CM ICD-9-CM   1. Neck strain, initial encounter S16.1XXA 847.0   2. Cervicalgia M54.2 723.1       Patient Active Problem List   Diagnosis   • Rotator cuff syndrome of right shoulder   • Other specified disorders of cartilage, shoulder   • SLAP lesion of right shoulder        Past Medical History:   Diagnosis Date   • Bronchitis, chronic    • Heart murmur    • History of ear infections    • History of strep sore throat    • Sinusitis         Past Surgical History:   Procedure Laterality Date   • SHOULDER SURGERY Right              PT Ortho       17 0800    Subjective Comments    Subjective Comments no new c/o.    -    Subjective Pain    Able to rate subjective pain? yes  -    Pre-Treatment Pain Level 1  -    Post-Treatment Pain Level 0  -    Neck    Flexion AROM --   35°  -AH    Extension AROM --   44°  -    Left Lateral Flexion AROM --   26°  -AH    Right Lateral Flexion AROM --   24°  -AH    Left Rotation AROM --   56°  -AH    Right Rotation AROM --   45°  -AH    Neck    Neck Flexion Gross Movement (4/5) good  -AH    Neck Extension (4-/5) good minus  -    Neck Manual Muscle Testing Detail B SB and rotation 4-/5  -      User Key  (r) = Recorded By, (t) = Taken By, (c) = Cosigned By    Initials Name Provider Type     Melody Hogue PTA Physical Therapy Assistant                            PT Assessment/Plan       17 0800       PT Assessment    Assessment Comments MD note written this tx.  good tolerance to ther ex.  Progressing towards goals.   -     PT Plan    PT Frequency 2x/week  -     PT Plan Comments  continue per PT poc and per MD  -AH       User Key  (r) = Recorded By, (t) = Taken By, (c) = Cosigned By    Initials Name Provider Type     Melody Hogue PTA Physical Therapy Assistant                Modalities       04/11/17 0800          Moist Heat    MH Applied Yes  -AH      Location cervical  -AH      MH S/P Rx Yes  -AH      ELECTRICAL STIMULATION    Attended/Unattended Unattended  -      Stimulation Type IFC  -AH      Location/Electrode Placement/Other posterior cervical/ UT  -AH      Rx Minutes 20 mins  -AH        User Key  (r) = Recorded By, (t) = Taken By, (c) = Cosigned By    Initials Name Provider Type     Melody Hogue PTA Physical Therapy Assistant                Exercises       04/11/17 0800          Subjective Comments    Subjective Comments no new c/o.    -      Subjective Pain    Able to rate subjective pain? yes  -AH      Pre-Treatment Pain Level 1  -AH      Post-Treatment Pain Level 0  -AH      Exercise 1    Exercise Name 1 pro2 UE f/r  -AH      Resistance 1 --   L6.0  -AH      Time (Minutes) 1 10 min  -AH      Exercise 2    Exercise Name 2 B-UT S  -AH      Reps 2 2  -AH      Time (Seconds) 2 30 sec  -AH      Exercise 3    Exercise Name 3 B- Lev S  -AH      Reps 3 2  -AH      Time (Seconds) 3 30 sec  -AH      Exercise 4    Exercise Name 4 Elephant S  -AH      Reps 4 2  -AH      Time (Seconds) 4 30 sec  -AH      Exercise 5    Exercise Name 5 posterior shoulder rolls  -AH      Reps 5 --   PRN B/W therex  -AH      Exercise 6    Exercise Name 6 Chin Tucks  -AH      Sets 6 2  -AH      Reps 6 10  -AH      Exercise 7    Exercise Name 7 scap clocks  -AH      Equipment 7 Theraband  -AH      Resistance 7 Green  -AH      Sets 7 2  -AH      Reps 7 10  -AH      Time (Seconds) 7 5  -AH      Exercise 8    Exercise Name 8 No Money   -AH      Equipment 8 Theraband  -AH      Resistance 8 Green  -AH      Reps 8 20  -AH      Exercise 9    Exercise Name 9 wall pushups  -AH      Reps 9 20  -AH      Exercise 10     Exercise Name 10 See Manual Flowsheet  -        User Key  (r) = Recorded By, (t) = Taken By, (c) = Cosigned By    Initials Name Provider Type     Melody Hogue PTA Physical Therapy Assistant                        Manual Rx (last 36 hours)      Manual Treatments       04/11/17 0900          Manual Rx 1    Manual Rx 1 Location C-spine  -      Manual Rx 1 Type Sub occipital release and MFR  -      Manual Rx 1 Duration 5 minutes  -        User Key  (r) = Recorded By, (t) = Taken By, (c) = Cosigned By    Initials Name Provider Type     Melody Hogue PTA Physical Therapy Assistant                PT OP Goals       04/11/17 0800       PT Short Term Goals    STG Date to Achieve 04/19/17  -     STG 1 I with HEP and have additions/changes by next recertification.  -     STG 1 Progress Met  -     STG 2 AROM cervical flexion/extension to 45° each.  -     STG 2 Progress Progressing;Ongoing  -     STG 3 AROM B cervical SB 30°.  -     STG 3 Progress Progressing;Ongoing  -     STG 4 AROM cervical ROT 65° B.  -     STG 4 Progress Progressing;Ongoing  -     STG 5 Cervical spine MMT 4/5 or greater in all directions.  -     STG 5 Progress Progressing;Ongoing  -     Long Term Goals    LTG Date to Achieve 05/03/17  -     LTG 1 AROM cervical flexion/extension 55° or greater.  -     LTG 1 Progress Ongoing  -     LTG 2 AROM cervical SB 40° B.  -     LTG 2 Progress Ongoing  -     LTG 3 AROM cervical B ROT 75° or greater.  -     LTG 3 Progress Ongoing  -     LTG 4 Cervical spine 5/5 in all directions.  -     LTG 4 Progress Ongoing  -     LTG 5 Negative quadrant B for c-spine.  -     LTG 5 Progress Ongoing  -     LTG 6 NDI score of less than 20%.  -     LTG 6 Progress Ongoing  -     LTG 7 I with final HEP.  -     LTG 7 Progress Ongoing  -     LTG 8 D/C with final HEP and free 30 day fitness formula membership.  -     LTG 8 Progress Not Met  -     Time Calculation    PT Goal  Re-Cert Due Date 04/19/17  -       User Key  (r) = Recorded By, (t) = Taken By, (c) = Cosigned By    Initials Name Provider Type     Melody Hogue PTA Physical Therapy Assistant                    Time Calculation:   Start Time: 0802  Stop Time: 0925  Time Calculation (min): 83 min    Therapy Charges for Today     Code Description Service Date Service Provider Modifiers Qty    62481368989 HC PT ELECTRICAL STIM UNATTENDED 4/11/2017 Melody Hogue PTA  1    26915682541 HC PT THER SUPP EA 15 MIN 4/11/2017 Melody Hogue PTA GP 1    58591212999 HC PT THER PROC EA 15 MIN 4/11/2017 Melody Hogue PTA GP 4                    Melody Hogue PTA  4/11/2017

## 2017-04-12 ENCOUNTER — HOSPITAL ENCOUNTER (OUTPATIENT)
Dept: PHYSICAL THERAPY | Facility: HOSPITAL | Age: 32
Setting detail: THERAPIES SERIES
Discharge: HOME | End: 2017-04-12

## 2017-04-12 PROCEDURE — 97110 THERAPEUTIC EXERCISES: CPT | Performed by: SPECIALIST/TECHNOLOGIST

## 2017-04-12 PROCEDURE — G0283 ELEC STIM OTHER THAN WOUND: HCPCS | Performed by: SPECIALIST/TECHNOLOGIST

## 2017-04-12 NOTE — PROGRESS NOTES
Outpatient Physical Therapy Ortho Treatment Note  [facility]     Patient Name: Ricardo Avila  : 1985  MRN: [id number]  Today's Date: 2017      Visit Date: 2017   Patients reports pain as:  3-4/10   Patient reports overall % improvement as:  45%    Patients attendance has been:      Insurance visits available  12 visits   Recert Date is:  17   Next MD visit is:  17       Visit Dx:  No diagnosis found.    Patient Active Problem List   Diagnosis   • Rotator cuff syndrome of right shoulder   • Other specified disorders of cartilage, shoulder   • SLAP lesion of right shoulder        Past Medical History:   Diagnosis Date   • Bronchitis, chronic    • Heart murmur    • History of ear infections    • History of strep sore throat    • Sinusitis         Past Surgical History:   Procedure Laterality Date   • SHOULDER SURGERY Right 2016             PT Ortho       17 0800    Subjective Comments    Subjective Comments no new c/o.    -AH    Subjective Pain    Able to rate subjective pain? yes  -    Pre-Treatment Pain Level 1  -    Post-Treatment Pain Level 0  -AH    Neck    Flexion AROM --   35°  -AH    Extension AROM --   44°  -AH    Left Lateral Flexion AROM --   26°  -AH    Right Lateral Flexion AROM --   24°  -AH    Left Rotation AROM --   56°  -AH    Right Rotation AROM --   45°  -AH    Neck    Neck Flexion Gross Movement (4/5) good  -AH    Neck Extension (4-/5) good minus  -AH    Neck Manual Muscle Testing Detail B SB and rotation 4-/5  -AH      User Key  (r) = Recorded By, (t) = Taken By, (c) = Cosigned By    Initials Name Provider Type     Melody Hogue PTA Physical Therapy Assistant                                    Exercises       17 0900          Subjective Comments    Subjective Comments (P)  hurting. saw MD this am. concerned with right side.  reeval w/ MD next week.  if no better refer to ortho    -ML      Subjective Pain    Able to rate  subjective pain? (P)  yes  -ML      Pre-Treatment Pain Level (P)  3  -ML      Exercise 1    Exercise Name 1 (P)  pro 2 UE alt F/R  -ML      Resistance 1 (P)  --   L4.0  -ML      Time (Minutes) 1 (P)  10 min  -ML      Exercise 2    Exercise Name 2 (P)  B-UT S  -ML      Sets 2 (P)  2  -ML      Time (Seconds) 2 (P)  30 sec  -ML      Exercise 3    Exercise Name 3 (P)  B-Lev S  -ML      Sets 3 (P)  2  -ML      Time (Seconds) 3 (P)  30 sec  -ML      Exercise 4    Exercise Name 4 (P)  Elephant S  -ML      Reps 4 (P)  2  -ML      Time (Seconds) 4 (P)  30 sec  -ML      Exercise 5    Exercise Name 5 (P)  chin tucks  -ML      Reps 5 (P)  20  -ML      Time (Seconds) 5 (P)  5 sec  -ML      Exercise 6    Exercise Name 6 (P)  No $  -ML      Equipment 6 (P)  Theraband  -ML      Resistance 6 (P)  Green  -ML      Sets 6 (P)  2  -ML      Reps 6 (P)  10  -ML      Exercise 7    Exercise Name 7 (P)  Swiss-wall push ups  -ML      Sets 7 (P)  2  -ML      Reps 7 (P)  10  -ML      Exercise 8    Exercise Name 8 (P)  shld horiz abd  -ML      Equipment 8 (P)  Theraband  -ML      Sets 8 (P)  2  -ML      Reps 8 (P)  10  -ML      Exercise 9    Exercise Name 9 (P)  supine: cx bobbleheads-large  -ML      Time (Minutes) 9 (P)  2  -ML      Exercise 10    Exercise Name 10 (P)  Supine: cx rot (chin up)  -ML      Reps 10 (P)  10  -ML      Exercise 11    Exercise Name 11 (P)  Supine: CX bobbleheads (very small)  -ML      Reps 11 (P)  30  -ML      Exercise 12    Exercise Name 12 (P)  Supine: tennis ball sub-occip release  -ML      Time (Minutes) 12 (P)  5 min  -ML        User Key  (r) = Recorded By, (t) = Taken By, (c) = Cosigned By    Initials Name Provider Type    ML Kevin Alan, ATC                         Manual Rx (last 36 hours)      Manual Treatments       04/11/17 0900          Manual Rx 1    Manual Rx 1 Location C-spine  -AH      Manual Rx 1 Type Sub occipital release and MFR  -AH      Manual Rx 1 Duration 5 minutes  -AH         User Key  (r) = Recorded By, (t) = Taken By, (c) = Cosigned By    Initials Name Provider Type     Melody Hogue, OLGA Physical Therapy Assistant                PT OP Goals       04/12/17 1000 04/12/17 0900    PT Short Term Goals    STG Date to Achieve (P)  04/19/17  -ML     STG 1 (P)  I with HEP and have additions/changes by next recertification.  -ML     STG 1 Progress (P)  Met  -ML     STG 2 (P)  AROM cervical flexion/extension to 45° each.  -ML     STG 2 Progress (P)  Progressing;Ongoing  -ML     STG 3 (P)  AROM B cervical SB 30°.  -ML     STG 3 Progress (P)  Progressing;Ongoing  -ML     STG 4 (P)  AROM cervical ROT 65° B.  -ML     STG 4 Progress (P)  Progressing;Ongoing  -ML     STG 5 (P)  Cervical spine MMT 4/5 or greater in all directions.  -ML     STG 5 Progress (P)  Progressing;Ongoing  -ML     Long Term Goals    LTG Date to Achieve (P)  05/03/17  -ML     LTG 1 (P)  AROM cervical flexion/extension 55° or greater.  -ML     LTG 1 Progress (P)  Ongoing  -ML     LTG 2 (P)  AROM cervical SB 40° B.  -ML     LTG 2 Progress (P)  Ongoing  -ML     LTG 3 (P)  AROM cervical B ROT 75° or greater.  -ML     LTG 3 Progress (P)  Ongoing  -ML     LTG 4 (P)  Cervical spine 5/5 in all directions.  -ML     LTG 4 Progress (P)  Ongoing  -ML     LTG 5 (P)  Negative quadrant B for c-spine.  -ML     LTG 5 Progress (P)  Ongoing  -ML     LTG 6 (P)  NDI score of less than 20%.  -ML     LTG 6 Progress (P)  Ongoing  -ML     LTG 7 (P)  I with final HEP.  -ML     LTG 7 Progress (P)  Ongoing  -ML     LTG 8 (P)  D/C with final HEP and free 30 day fitness formula membership.  -ML     LTG 8 Progress (P)  Not Met  -ML     Time Calculation    PT Goal Re-Cert Due Date  (P)  04/19/17  -ML      User Key  (r) = Recorded By, (t) = Taken By, (c) = Cosigned By    Initials Name Provider Type    DOROTHY Alan, ATC                     Time Calculation:   Start Time: (P) 9476  Stop Time: (P) 1128  Time Calculation (min): (P) 14  min    Therapy Charges for Today     Code Description Service Date Service Provider Modifiers Qty    94968593740 HC PT THER PROC EA 15 MIN 4/12/2017 Kevin Alan, ATC  1    72178391424 HC PT THER SUPP EA 15 MIN 4/12/2017 Kevin Alan, ATC  1    24247229896 HC PT ELECTRICAL STIM UNATTENDED 4/12/2017 Kevin Alan, ATC  1    59678639039 HC PT THER PROC EA 15 MIN 4/12/2017 Kevin Alan, ATC  3                    Kevin Alan, ATC  4/12/2017

## 2017-04-14 ENCOUNTER — HOSPITAL ENCOUNTER (OUTPATIENT)
Dept: PHYSICAL THERAPY | Facility: HOSPITAL | Age: 32
Setting detail: THERAPIES SERIES
Discharge: HOME | End: 2017-04-14

## 2017-04-14 DIAGNOSIS — S16.1XXA NECK STRAIN, INITIAL ENCOUNTER: Primary | ICD-10-CM

## 2017-04-14 DIAGNOSIS — M54.2 CERVICALGIA: ICD-10-CM

## 2017-04-14 PROCEDURE — 97110 THERAPEUTIC EXERCISES: CPT

## 2017-04-14 PROCEDURE — G0283 ELEC STIM OTHER THAN WOUND: HCPCS

## 2017-04-14 NOTE — PROGRESS NOTES
"    Outpatient Physical Therapy Ortho Treatment Note  [facility] [place]  Misty Panchal PTA       Patient Name: Ricardo Avila  : 1985  MRN: [id number]  Today's Date: 2017      Visit Date: 2017     Visits: 8/8  Insurance Visits Approved: 12 visits  Recert Due:  2017  MD Appt: 2017  Pain: pretreatment 2/10; post treatment 0/10  Improvement: pt is subjectively reporting 45% improvement since initial evaluation    Visit Dx:    ICD-10-CM ICD-9-CM   1. Neck strain, initial encounter S16.1XXA 847.0   2. Cervicalgia M54.2 723.1       Patient Active Problem List   Diagnosis   • Rotator cuff syndrome of right shoulder   • Other specified disorders of cartilage, shoulder   • SLAP lesion of right shoulder        Past Medical History:   Diagnosis Date   • Bronchitis, chronic    • Heart murmur    • History of ear infections    • History of strep sore throat    • Sinusitis         Past Surgical History:   Procedure Laterality Date   • SHOULDER SURGERY Right 2016             PT Ortho       17 0800    Subjective Comments    Subjective Comments states that he sees the doctor again next wednesday. reports that he has one spot on the right side of his neck that is still bothering him. feels like its just plateaued there and isn't getting better. feels like he's stuck at 45% improved. feels like there is a spot \"bulging\" there  -    Subjective Pain    Post-Treatment Pain Level 0  -    Posture/Observations    Posture/Observations Comments appears in no acute distress  -    Neck    Flexion AROM --   55°  -    Extension AROM --   58°  -    Left Lateral Flexion AROM --   35°  -    Right Lateral Flexion AROM --   34°  -    Left Rotation AROM --   65°  -    Right Rotation AROM --   60°  -    Neck    Neck Flexion Gross Movement (5/5) normal  -    Neck Extension (5/5) normal  -    Neck Manual Muscle Testing Detail B SB 5/5; L Rot 5/5, R Rot 4+/5   variable effort given " "with MMT today  -      User Key  (r) = Recorded By, (t) = Taken By, (c) = Cosigned By    Initials Name Provider Type     Misty SUMA OLGA Panchal Physical Therapy Assistant                            PT Assessment/Plan       04/14/17 0800       PT Assessment    Assessment Comments variable effort given with MMT initially, but with PTA resisted Isometrics patient demonstrates improved strength over original MMT. does demonstrate improved AROM this treatment.   -     PT Plan    PT Frequency 2x/week  -     PT Plan Comments MD note next visit  -       User Key  (r) = Recorded By, (t) = Taken By, (c) = Cosigned By    Initials Name Provider Type     Mistyzhane Panchal PTA Physical Therapy Assistant                Modalities       04/14/17 0800          Moist Heat     Applied Yes  -      Location cervical  -[facility] S/P Rx Yes  -      ELECTRICAL STIMULATION    Attended/Unattended Unattended  -      Stimulation Type IFC  -      Max mAmp 12  -      Location/Electrode Placement/Other posterior cervical/ UT  -      Rx Minutes 20 mins  -        User Key  (r) = Recorded By, (t) = Taken By, (c) = Cosigned By    Initials Name Provider Type     Misty MOISE OLGA Panchal Physical Therapy Assistant                Exercises       04/14/17 0800          Subjective Comments    Subjective Comments states that he sees the doctor again next wednesday. reports that he has one spot on the right side of his neck that is still bothering him. feels like its just plateaued there and isn't getting better. feels like he's stuck at 45% improved. feels like there is a spot \"bulging\" there  -      Subjective Pain    Able to rate subjective pain? yes  -      Pre-Treatment Pain Level 2  -      Post-Treatment Pain Level 0  -      Exercise 1    Exercise Name 1 Pro II UE alt F/R  -      Resistance 1 --   L 5.0  -      Time (Minutes) 1 10 min  -      Exercise 2    Exercise Name 2 B-UT S  -      Sets 2 2  -MH      Time " (Seconds) 2 30 sec  -MH      Exercise 3    Exercise Name 3 B-Lev S  -MH      Sets 3 2  -MH      Time (Seconds) 3 30 sec  -MH      Exercise 4    Exercise Name 4 Elephant S  -MH      Reps 4 2  -MH      Time (Seconds) 4 30 sec  -MH      Exercise 5    Exercise Name 5 Alt C-Spine Iso Flex/Ext   resistance applied by [facility]      Reps 5 2  -MH      Time (Seconds) 5 30 sec  -MH      Exercise 6    Exercise Name 6 Alt C-Spine Iso SB   resistance applied by [facility]      Reps 6 2  -MH      Time (Seconds) 6 30 sec  -MH      Exercise 7    Exercise Name 7 Alt C-Cpine Iso Rot  -MH      Time (Seconds) 7 30 sec  -MH      Exercise 8    Exercise Name 8 Supine: C-spine bobbleheads- large  -MH      Time (Minutes) 8 3 min  -MH      Exercise 9    Exercise Name 9 Supine: C-spine bobblehead- small  -MH      Time (Minutes) 9 3 min  -MH      Exercise 10    Exercise Name 10 Supine: C-spine Rot- chin up  -MH      Time (Minutes) 10 2 min  -MH      Exercise 11    Exercise Name 11 Swiss Wall Push-ups  -MH      Reps 11 20  -MH      Exercise 12    Exercise Name 12 Tband Horizontal ABD  -MH      Equipment 12 Theraband  -MH      Resistance 12 Green  -MH      Reps 12 20  -MH      Exercise 13    Exercise Name 13 Tband No Money  -MH      Equipment 13 Theraband  -MH      Resistance 13 Green  -MH      Reps 13 20  -MH      Exercise 14    Exercise Name 14 Tband B Shld Ext  -MH      Equipment 14 Theraband  -MH      Resistance 14 Green  -MH      Reps 14 20  -MH      Exercise 15    Exercise Name 15 Tband Rows Mid-Low  -MH      Equipment 15 Theraband  -MH      Resistance 15 Green  -MH      Reps 15 20  -MH        User Key  (r) = Recorded By, (t) = Taken By, (c) = Cosigned By    Initials Name Provider Type     Misty Panchal, PTA Physical Therapy Assistant                               PT OP Goals       04/14/17 0800       PT Short Term Goals    STG Date to Achieve 04/19/17  -MH     STG 1 I with HEP and have additions/changes by next recertification.  -      STG 1 Progress Met  -     STG 2 AROM cervical flexion/extension to 45° each.  -     STG 2 Progress Met  -     STG 3 AROM B cervical SB 30°.  -     STG 3 Progress Met  -     STG 4 AROM cervical ROT 65° B.  -     STG 4 Progress Partially Met   left side met, right side not met  -     STG 5 Cervical spine MMT 4/5 or greater in all directions.  -     STG 5 Progress Met  -     Long Term Goals    LTG Date to Achieve 05/03/17  -     LTG 1 AROM cervical flexion/extension 55° or greater.  -     LTG 1 Progress Met  -     LTG 2 AROM cervical SB 40° B.  -     LTG 2 Progress Ongoing  -     LTG 3 AROM cervical B ROT 75° or greater.  -     LTG 3 Progress Ongoing  -     LTG 4 Cervical spine 5/5 in all directions.  -     LTG 4 Progress Ongoing  -     LTG 5 Negative quadrant B for c-spine.  -     LTG 5 Progress Ongoing  -     LTG 6 NDI score of less than 20%.  -     LTG 6 Progress Ongoing  -     LTG 7 I with final HEP.  -     LTG 7 Progress Ongoing  -     LTG 8 D/C with final HEP and free 30 day fitness formula membership.  -     LTG 8 Progress Not Met  -     Time Calculation    PT Goal Re-Cert Due Date 04/19/17  -       User Key  (r) = Recorded By, (t) = Taken By, (c) = Cosigned By    Initials Name Provider Type    GO Panchal PTA Physical Therapy Assistant                Therapy Education       04/14/17 0800          Therapy Education    Given HEP;Symptoms/condition management;Pain management;Posture/body mechanics  -      Program Reinforced  -      How Provided Verbal  -      Provided to Patient  -      Level of Understanding Verbalized  -        User Key  (r) = Recorded By, (t) = Taken By, (c) = Cosigned By    Initials Name Provider Type    GO Panchal PTA Physical Therapy Assistant                Time Calculation:   Start Time: 0756  Stop Time: 0905  Time Calculation (min): 69 min  Total Timed Code Minutes- PT: 49 minute(s)    Therapy Charges for Today      Code Description Service Date Service Provider Modifiers Qty    22189089908 HC PT THER SUPP EA 15 MIN 4/14/2017 Misty Panchal, PTA GP 1    88012211755 HC PT THER PROC EA 15 MIN 4/14/2017 Misty Panchal, PTA GP 3    73614616230 HC PT ELECTRICAL STIM UNATTENDED 4/14/2017 Misty Panchal, PTA  1                    Misty Panchal, PTA  4/14/2017

## 2017-04-18 ENCOUNTER — HOSPITAL ENCOUNTER (OUTPATIENT)
Dept: PHYSICAL THERAPY | Facility: HOSPITAL | Age: 32
Setting detail: THERAPIES SERIES
Discharge: HOME | End: 2017-04-18

## 2017-04-18 DIAGNOSIS — S16.1XXA NECK STRAIN, INITIAL ENCOUNTER: Primary | ICD-10-CM

## 2017-04-18 DIAGNOSIS — M54.2 CERVICALGIA: ICD-10-CM

## 2017-04-18 PROCEDURE — G0283 ELEC STIM OTHER THAN WOUND: HCPCS | Performed by: PHYSICAL THERAPY ASSISTANT

## 2017-04-18 PROCEDURE — 97110 THERAPEUTIC EXERCISES: CPT | Performed by: PHYSICAL THERAPY ASSISTANT

## 2017-04-18 NOTE — PROGRESS NOTES
Outpatient Physical Therapy Ortho Treatment Note  [facility]     Patient Name: Ricardo Avila  : 1985  MRN: [id number]  Today's Date: 2017      Visit Date: 2017    Visits    Recert Date 17   MD appointment 17   Pt reports  45% improvement       Visit Dx:    ICD-10-CM ICD-9-CM   1. Neck strain, initial encounter S16.1XXA 847.0   2. Cervicalgia M54.2 723.1       Patient Active Problem List   Diagnosis   • Rotator cuff syndrome of right shoulder   • Other specified disorders of cartilage, shoulder   • SLAP lesion of right shoulder        Past Medical History:   Diagnosis Date   • Bronchitis, chronic    • Heart murmur    • History of ear infections    • History of strep sore throat    • Sinusitis         Past Surgical History:   Procedure Laterality Date   • SHOULDER SURGERY Right              PT Ortho       17 0800    Subjective Pain    Post-Treatment Pain Level 2  -    Neck    Flexion AROM --   62°  -JH    Extension AROM --   50°  -    Left Lateral Flexion AROM --   34°  -    Right Lateral Flexion AROM --   12°  -JH    Left Rotation AROM --   41°  -JH    Right Rotation AROM --   30°  -      User Key  (r) = Recorded By, (t) = Taken By, (c) = Cosigned By    Initials Name Provider Type    ZORAN Davis PTA Physical Therapy Assistant                            PT Assessment/Plan       17 0800       PT Assessment    Assessment Comments Pt had a difference in AROM this vist, decent neha with Ther ex this visit.  -     PT Plan    PT Frequency 2x/week  -     PT Plan Comments Recheck next visit  -       User Key  (r) = Recorded By, (t) = Taken By, (c) = Cosigned By    Initials Name Provider Type    ZORAN Davis PTA Physical Therapy Assistant                Modalities       17 0800          Moist Heat    MH Applied --   unable 2° hydroculator not working  -      ELECTRICAL STIMULATION    Attended/Unattended Unattended  -      Stimulation  Type IFC  -JH      Max mAmp 12  -JH      Location/Electrode Placement/Other posterior cervical/ UT  -JH      Rx Minutes 20 mins  -JH        User Key  (r) = Recorded By, (t) = Taken By, (c) = Cosigned By    Initials Name Provider Type    ZORAN Davis PTA Physical Therapy Assistant                Exercises       04/18/17 0800          Subjective Comments    Subjective Comments Pt reports he got of the couch during the weekend and had a sharp pain in the neck. Pt states he was unable to move neck at that time. Pt claims it is a little better but not completely.  -JH      Subjective Pain    Able to rate subjective pain? yes  -JH      Pre-Treatment Pain Level 4  -JH      Post-Treatment Pain Level 2  -JH      Exercise 1    Exercise Name 1 Pro II UE alt F/R  -JH      Resistance 1 --   L 5.0  -JH      Time (Minutes) 1 10 min  -JH      Exercise 2    Exercise Name 2 B-UT S  -JH      Sets 2 2  -JH      Time (Seconds) 2 30 sec  -JH      Exercise 3    Exercise Name 3 B-Lev S  -JH      Sets 3 2  -JH      Time (Seconds) 3 30 sec  -JH      Exercise 4    Exercise Name 4 Elephant S  -JH      Reps 4 2  -JH      Time (Seconds) 4 30 sec  -JH      Exercise 5    Exercise Name 5 chin tucks  -JH      Reps 5 20  -JH      Time (Seconds) 5 5 sec  -JH      Exercise 6    Exercise Name 6 C-spine ISO 6 way  -JH      Reps 6 20  -JH      Time (Seconds) 6 5 sec hold  -JH      Exercise 7    Exercise Name 7 MD note  -JH      Exercise 8    Exercise Name 8 no money  -JH      Equipment 8 Theraband  -JH      Resistance 8 Green  -JH      Sets 8 2  -JH      Reps 8 10  -JH      Exercise 9    Exercise Name 9 horizontal ABD  -JH      Equipment 9 Theraband  -JH      Resistance 9 Green  -JH      Reps 9 20  -JH      Exercise 10    Exercise Name 10 rows mid high  -JH      Equipment 10 Theraband  -JH      Resistance 10 Green  -JH      Reps 10 20  -JH        User Key  (r) = Recorded By, (t) = Taken By, (c) = Cosigned By    Initials Name Provider Type    ZORAN Vaz  ALIA Davis PTA Physical Therapy Assistant                               PT OP Goals       04/18/17 0800       PT Short Term Goals    STG Date to Achieve 04/19/17  -     STG 1 I with HEP and have additions/changes by next recertification.  -     STG 1 Progress Met  -     STG 2 AROM cervical flexion/extension to 45° each.  -     STG 2 Progress Met  -     STG 3 AROM B cervical SB 30°.  -     STG 3 Progress Met  -     STG 4 AROM cervical ROT 65° B.  -     STG 4 Progress Partially Met   left side met, right side not met  -     STG 5 Cervical spine MMT 4/5 or greater in all directions.  -     STG 5 Progress Met  -     Long Term Goals    LTG Date to Achieve 05/03/17  -     LTG 1 AROM cervical flexion/extension 55° or greater.  -     LTG 1 Progress Met  -     LTG 2 AROM cervical SB 40° B.  -     LTG 2 Progress Ongoing  -     LTG 3 AROM cervical B ROT 75° or greater.  -     LTG 3 Progress Ongoing  -     LTG 4 Cervical spine 5/5 in all directions.  -     LTG 4 Progress Ongoing  -     LTG 5 Negative quadrant B for c-spine.  -     LTG 5 Progress Ongoing  -     LTG 6 NDI score of less than 20%.  -     LTG 6 Progress Ongoing  -     LTG 7 I with final HEP.  -     LT 7 Progress Ongoing  -     LTG 8 D/C with final HEP and free 30 day fitness formula membership.  -[facility] 8 Progress Not Met  -     Time Calculation    PT Goal Re-Cert Due Date 04/19/17  -       User Key  (r) = Recorded By, (t) = Taken By, (c) = Cosigned By    Initials Name Provider Type     Milind Davis PTA Physical Therapy Assistant                    Time Calculation:   Start Time: 0800  Stop Time: 0905  Time Calculation (min): 65 min    Therapy Charges for Today     Code Description Service Date Service Provider Modifiers Qty    52806908469 HC PT THER PROC EA 15 MIN 4/18/2017 Milind Davis PTA GP 3    93687177830 HC PT ELECTRICAL STIM UNATTENDED 4/18/2017 Milind Davis PTA  1                    Milind ROJO  Susan, PTA  4/18/2017

## 2017-04-19 ENCOUNTER — APPOINTMENT (OUTPATIENT)
Dept: PHYSICAL THERAPY | Facility: HOSPITAL | Age: 32
End: 2017-04-19

## 2017-04-21 ENCOUNTER — HOSPITAL ENCOUNTER (OUTPATIENT)
Dept: PHYSICAL THERAPY | Facility: HOSPITAL | Age: 32
Setting detail: THERAPIES SERIES
Discharge: HOME | End: 2017-04-21

## 2017-04-21 DIAGNOSIS — M54.2 CERVICALGIA: ICD-10-CM

## 2017-04-21 DIAGNOSIS — S16.1XXA NECK STRAIN, INITIAL ENCOUNTER: Primary | ICD-10-CM

## 2017-04-21 PROCEDURE — 97140 MANUAL THERAPY 1/> REGIONS: CPT | Performed by: PHYSICAL THERAPIST

## 2017-04-21 PROCEDURE — G0283 ELEC STIM OTHER THAN WOUND: HCPCS | Performed by: PHYSICAL THERAPIST

## 2017-04-21 PROCEDURE — 97110 THERAPEUTIC EXERCISES: CPT | Performed by: PHYSICAL THERAPIST

## 2017-04-21 NOTE — PROGRESS NOTES
"    Outpatient Physical Therapy Ortho Progress Note  [facility]  Allison Arreola, PT, DPT, CSCS       Patient Name: Ricardo Avila  : 1985  MRN: [id number]  Today's Date: 2017      Visit Date: 2017     Pt reports 3/10 pain.  Reports 45% of improvement.  Attended 10/10 visits.  Insurance available: 12 visits, will request 6 more.  Next MD appt: VERONICA .  Recertification: 2017.    Patient Active Problem List   Diagnosis   • Rotator cuff syndrome of right shoulder   • Other specified disorders of cartilage, shoulder   • SLAP lesion of right shoulder        Past Medical History:   Diagnosis Date   • Bronchitis, chronic    • Heart murmur    • History of ear infections    • History of strep sore throat    • Sinusitis         Past Surgical History:   Procedure Laterality Date   • SHOULDER SURGERY Right 2016       Visit Dx:     ICD-10-CM ICD-9-CM   1. Neck strain, initial encounter S16.1XXA 847.0   2. Cervicalgia M54.2 723.1     Number of days off work: Since the accident.    Changes to medications: None noted.    Changes to MD orders: MRI ordered and performed, awaiting follow-up visit.            PT Ortho       17 0800    Subjective Pain    Able to rate subjective pain? yes  -AJ    Pre-Treatment Pain Level 3  -AJ    Post-Treatment Pain Level 2   \"Sore\"  -AJ    Posture/Observations    Posture- WNL Posture is WNL  -AJ    Posture/Observations Comments Good overall postural awareness, no acute distress.  -AJ    Sensation    Sensation WNL? WNL  -AJ    Light Touch No apparent deficits  -AJ    Sharp/Dull No apparent deficits  -AJ    Additional Comments Patient denies and numbness or tingling.  -AJ    Special Tests/Palpation    Special Tests/Palpation --   + TTP R C5/C6 and suboccipitals  -AJ    Cervical/Thoracic Special Tests    Spurlings (Foraminal Compression) Negative  -AJ    Cervical Compression (Forarminal Compression vs. Facet Pain) Right:;Positive;Left:;Negative  " -AJ    Cervical Distraction (Foraminal Compression vs. Facet Pain) Bilateral:;Negative  -AJ    Transverse Ligament (Instability) Negative  -AJ    Vertebral Artery Test (VBI Sign) Negative  -AJ    Maninder's Signs    Superficial and non-anatomical tenderness Positive  -AJ    Simulation test Positive  -AJ    Distraction straight leg raise test (sitting vs supine) Negative  -AJ    Regional disturbances Positive  -AJ    Overreaction to examination Negative  -AJ    Neck    Flexion AROM --   45°  -AJ    Extension AROM --   60°  -AJ    Left Lateral Flexion AROM --   42°  -AJ    Right Lateral Flexion AROM --   30°  -AJ    Left Rotation AROM --   74°  -AJ    Right Rotation AROM --   54°  -AJ    MMT (Manual Muscle Testing)    General MMT Assessment Detail B UE 5/5 except R flexion 4/5 poor effort  -AJ    Neck    Neck Flexion Gross Movement (4+/5) good plus  -AJ    Neck Extension (5/5) normal  -AJ    Neck Manual Muscle Testing Detail R C-spine ROT/SB 4/5, L 4+/5   poor effort  -AJ    Upper Extremity Flexibility    Suboccipitals Right:;Mildly limited;Left:;Bilateral:  -AJ    Scalenes Bilateral:;WNL  -AJ    Upper Trapezius Right:;Mildly limited;Left:;WNL  -AJ    Levator Scapula Bilateral:;WNL  -AJ    Transfers    Transfer, Comment I with all transfers.  -AJ    Gait Assessment/Treatment    Gait, McKenzie Level independent  -AJ    Gait, Comment FWB, non-antalgic gait, no guarding of any kind.  -AJ      User Key  (r) = Recorded By, (t) = Taken By, (c) = Cosigned By    Initials Name Provider Type    AJ Allison Arreola PT Physical Therapist                  Therapy Education       04/21/17 0800          Therapy Education    Given HEP;Symptoms/condition management;Pain management;Posture/body mechanics   Plan of Care  -AJ      Program Reinforced  -AJ      How Provided Verbal  -AJ      Provided to Patient  -AJ      Level of Understanding Verbalized  -AJ        User Key  (r) = Recorded By, (t) = Taken By, (c) = Cosigned By     Initials Name Provider Type    AJ Allison Arreola, PT Physical Therapist                PT OP Goals       04/21/17 0900 04/21/17 0800    PT Short Term Goals    STG Date to Achieve 05/12/17  -AJ 04/19/17  -AJ    STG 1  I with HEP and have additions/changes by next recertification.  -AJ    STG 1 Progress  Met  -AJ    STG 2  AROM cervical flexion/extension to 45° each.  -AJ    STG 2 Progress  Met  -AJ    STG 3 B UE 5/5  -AJ AROM B cervical SB 30°.  -AJ    STG 3 Progress Ongoing;New  -AJ Met  -AJ    STG 4 AROM cervical ROT 65° B.  -AJ AROM cervical ROT 65° B.  -AJ    STG 4 Progress Ongoing  -AJ Partially Met  -AJ    STG 4 Progress Comments  Met on L, not R.  -AJ    STG 5 Cervical spine MMT 4+/5 or greater in all directions.  -AJ Cervical spine MMT 4/5 or greater in all directions.  -AJ    STG 5 Progress Goal Revised;Ongoing  -AJ Met  -AJ    Long Term Goals    LTG Date to Achieve 05/19/17  -AJ 05/03/17  -AJ    LTG 1 AROM cervical flexion 55° or greater.  -AJ AROM cervical flexion/extension 55° or greater.  -AJ    LTG 1 Progress Ongoing;Goal Revised  -AJ Partially Met  -AJ    LTG 1 Progress Comments  Met for extensaion, not flexion  -AJ    LTG 2 AROM cervical SB 40° B.  -AJ AROM cervical SB 40° B.  -AJ    LTG 2 Progress Ongoing  -AJ Ongoing;Partially Met  -AJ    LTG 2 Progress Comments  Met for L, not R.  -AJ    LTG 3 AROM cervical B ROT 75° or greater.  -AJ AROM cervical B ROT 75° or greater.  -AJ    LTG 3 Progress Ongoing  -AJ Ongoing;Not Met  -AJ    LTG 3 Progress Comments  Met for L, not R  -AJ    LTG 4 Cervical spine 5/5 in all directions.  -AJ Cervical spine 5/5 in all directions.  -AJ    LTG 4 Progress Ongoing  -AJ Ongoing;Not Met  -AJ    LTG 5 Negative quadrant B for c-spine.  -AJ Negative quadrant B for c-spine.  -AJ    LTG 5 Progress Ongoing  -AJ Ongoing;Not Met  -AJ    LTG 6 NDI score of less than 20%.  -AJ NDI score of less than 20%.  -AJ    LTG 6 Progress Ongoing  -AJ Ongoing;Not Met  -AJ    LTG 7 I with  final HEP.  -AJ I with final HEP.  -AJ    LTG 7 Progress Ongoing  -AJ Ongoing;Not Met  -AJ    LTG 8 D/C with final HEP and free 30 day fitness formula membership.  -AJ D/C with final HEP and free 30 day fitness formula membership.  -AJ    LTG 8 Progress Ongoing  -AJ Not Met;Ongoing  -AJ    Time Calculation    PT Goal Re-Cert Due Date 05/12/17  -AJ       User Key  (r) = Recorded By, (t) = Taken By, (c) = Cosigned By    Initials Name Provider Type    DARLING Arreola, PT Physical Therapist        Barriers to Rehab: Include significant or possible arthritic/degenerative changes that have occurred within the spine, The patient's obesity.    Safety Issues: None noted.            PT Assessment/Plan       04/21/17 0800       PT Assessment    Functional Limitations Limitation in home management;Limitations in community activities;Performance in leisure activities;Performance in self-care ADL;Performance in work activities  -     Impairments Impaired flexibility;Impaired muscle endurance;Impaired muscle length;Impaired muscle power;Joint mobility;Motor function;Muscle strength;Pain;Poor body mechanics;Posture;Range of motion  -     Assessment Comments Patient has some presecence of Maninder's s/s today but also has some objective TTP and matching s/s with subjective complaints. Is progeressing with ROm and strength.  -AJ     Rehab Potential Good  -AJ     Patient/caregiver participated in establishment of treatment plan and goals Yes  -AJ     Patient would benefit from skilled therapy intervention Yes  -AJ     PT Plan    PT Frequency 2x/week  -AJ     Predicted Duration of Therapy Intervention (days/wks) 2-3 weeks, 4-6 more visits  -AJ     Planned CPT's? PT RE-EVAL: 38125;PT THER PROC EA 15 MIN: 78429;PT THER ACT EA 15 MIN: 47228;PT MANUAL THERAPY EA 15 MIN: 31654;PT ELECTRICAL STIM UNATTEND: ;PT THER SUPP EA 15 MIN  -AJ     Physical Therapy Interventions (Optional Details) dry needling;gross motor skills;home  "exercise program;joint mobilization;modalities;manual therapy techniques;neuromuscular re-education;patient/family education;postural re-education;ROM (Range of Motion);strengthening;stretching  -AJ     PT Plan Comments Initiate DN pending MRI results and MD recommendations. Continue with manual c-spine mobolizatins and MFR/ME  -AJ       User Key  (r) = Recorded By, (t) = Taken By, (c) = Cosigned By    Initials Name Provider Type    DARLING Arreola PT Physical Therapist       Other therapeutic activities and/or exercises will be prescribed depending on the patients progress or lack there of.          Modalities       04/21/17 0800          Moist Heat    MH Applied Yes  -AJ      Location cervical  -AJ      MH S/P Rx Yes  -AJ      ELECTRICAL STIMULATION    Attended/Unattended Unattended  -AJ      Stimulation Type IFC  -AJ      Location/Electrode Placement/Other posterior cervical/ UT  -AJ      Rx Minutes 20 mins  -AJ        User Key  (r) = Recorded By, (t) = Taken By, (c) = Cosigned By    Initials Name Provider Type    DARLING Arreola, PT Physical Therapist              Exercises       04/21/17 0800          Subjective Comments    Subjective Comments Patient reports he had an MRI yesterday and is not sure when he follows up with the doctor. He reports he stlill has trouble turning his head to the right. He reports he spent most of the night at the night at the hospital with his father-in-law. He reports he feels the pain in his neck this week has progressively orsened and the spot on the R side gives him the most trouble. Stil only 45% improved.  -AJ      Subjective Pain    Able to rate subjective pain? yes  -AJ      Pre-Treatment Pain Level 3  -AJ      Post-Treatment Pain Level 2   \"Sore\"  -AJ      Exercise 1    Exercise Name 1 Pro II UE alt F/R  -AJ      Resistance 1 --   Level 7.0  -AJ      Time (Minutes) 1 10 min  -AJ      Exercise 2    Exercise Name 2 B UT S  -AJ      Reps 2 2  -AJ      Time " "(Seconds) 2 30 sec  -AJ      Exercise 3    Exercise Name 3 B LS S  -AJ      Reps 3 2  -AJ      Time (Seconds) 3 30 sec  -AJ      Exercise 4    Exercise Name 4 Elelphant S  -AJ      Reps 4 2  -AJ      Time (Seconds) 4 30 sec  -AJ      Exercise 5    Exercise Name 5 Manual- see manual section  -AJ      Exercise 6    Exercise Name 6 Chin Tucks  -AJ      Reps 6 20  -AJ      Time (Seconds) 6 5 sec hold  -AJ      Exercise 7    Exercise Name 7 Alt Cervical SB, ROT isometric  -AJ      Reps 7 10   each  -AJ      Time (Seconds) 7 5\" hold  -AJ      Exercise 8    Exercise Name 8 T-band cervical flexion  -AJ      Equipment 8 Theraband  -AJ      Resistance 8 Red  -AJ      Reps 8 10  -AJ      Exercise 9    Exercise Name 9 T-band cervical SB  -AJ      Equipment 9 Theraband  -AJ      Resistance 9 Red  -AJ      Reps 9 10  -AJ        User Key  (r) = Recorded By, (t) = Taken By, (c) = Cosigned By    Initials Name Provider Type    DARLING Arreola, PT Physical Therapist           Manual Rx (last 36 hours)      Manual Treatments       04/21/17 0700          Manual Rx 1    Manual Rx 1 Location C-spine  -AJ      Manual Rx 1 Type MFR, ROT ME, C5/C6 mobs, Sub ocipital release  -AJ      Manual Rx 1 Grade III  -AJ      Manual Rx 1 Duration 12 minutes  -AJ        User Key  (r) = Recorded By, (t) = Taken By, (c) = Cosigned By    Initials Name Provider Type    DARLING Arreola, PT Physical Therapist                            Outcome Measures       04/21/17 0800          Neck Disability Index    Section 1 - Pain Intensity 2  -AJ      Section 2 - Personal Care 3  -AJ      Section 3 - Lifting 4  -AJ      Section 4 - Work 5  -AJ      Section 5 - Headaches 5  -AJ      Section 6 - Concentration 2  -AJ      Section 7 - Sleeping 3  -AJ      Section 8 - Driving 3  -AJ      Section 9 - Reading 3  -AJ      Section 10 - Recreation 3  -AJ      Neck Disability Index Score 33  -AJ      Functional Assessment    Outcome Measure Options Neck " Disability Index (NDI)  -DARLING        User Key  (r) = Recorded By, (t) = Taken By, (c) = Cosigned By    Initials Name Provider Type    DARLING Arreola PT Physical Therapist            Time Calculation:   Start Time: 0800  Stop Time: 0908  Time Calculation (min): 68 min  Total Timed Code Minutes- PT: 48 minute(s)     Therapy Charges for Today     Code Description Service Date Service Provider Modifiers Qty    81682716463 HC PT THER PROC EA 15 MIN 4/21/2017 Allison Arreola, PT GP 2    95055833669 HC PT MANUAL THERAPY EA 15 MIN 4/21/2017 Allison Arreola, PT GP 1    21621325683 HC PT ELECTRICAL STIM UNATTENDED 4/21/2017 Allison Arreola PT  1    32840540588 HC PT THER SUPP EA 15 MIN 4/21/2017 Allison Arreola, PT GP 1          PT G-Codes  Outcome Measure Options: Neck Disability Index (NDI)         Allison Arreola PT, DPT, CSCS  4/21/2017

## 2017-04-25 ENCOUNTER — HOSPITAL ENCOUNTER (OUTPATIENT)
Dept: PHYSICAL THERAPY | Facility: HOSPITAL | Age: 32
Setting detail: THERAPIES SERIES
Discharge: HOME | End: 2017-04-25

## 2017-04-25 DIAGNOSIS — S16.1XXA NECK STRAIN, INITIAL ENCOUNTER: Primary | ICD-10-CM

## 2017-04-25 DIAGNOSIS — M54.2 CERVICALGIA: ICD-10-CM

## 2017-04-25 PROCEDURE — 97110 THERAPEUTIC EXERCISES: CPT

## 2017-04-25 PROCEDURE — 97140 MANUAL THERAPY 1/> REGIONS: CPT

## 2017-04-25 PROCEDURE — G0283 ELEC STIM OTHER THAN WOUND: HCPCS

## 2017-04-25 NOTE — PROGRESS NOTES
Outpatient Physical Therapy Ortho Treatment Note  [facility]  Misty Panchal PTA       Patient Name: Ricardo Avila  : 1985  MRN: [id number]  Today's Date: 2017      Visit Date: 2017     Visits:   Insurance Visits Approved: 12 visits  Recert Due: 2017  MD Appt: 2017  Pain: pretreatment 2/10; post treatment -/10  Improvement: pt is subjectively reporting 50% improvement since initial evaluation    Visit Dx:    ICD-10-CM ICD-9-CM   1. Neck strain, initial encounter S16.1XXA 847.0   2. Cervicalgia M54.2 723.1       Patient Active Problem List   Diagnosis   • Rotator cuff syndrome of right shoulder   • Other specified disorders of cartilage, shoulder   • SLAP lesion of right shoulder        Past Medical History:   Diagnosis Date   • Bronchitis, chronic    • Heart murmur    • History of ear infections    • History of strep sore throat    • Sinusitis         Past Surgical History:   Procedure Laterality Date   • SHOULDER SURGERY Right              PT Ortho       17 0800    Subjective Comments    Subjective Comments states that over the weekend his neck locked up over the weekend and he couldn't even move his neck at all. couldn't do his exercises. states that he has an MD appointment tomorrow to discuss his MRI results from the MRI that was taken last thursday.   -    Subjective Pain    Able to rate subjective pain? yes  -    Pre-Treatment Pain Level 2   rates pain 3/10 initially, later changes to 2/10  -    Post-Treatment Pain Level --   does not rate  -    Posture/Observations    Posture/Observations Comments no muscle tension noted along cspine bilaterally or at UT's today  -    Special Tests/Palpation    Special Tests/Palpation Cervical/Thoracic   TTP R C-Spine Paraspinals and UT  -    Neck    Flexion AROM --   51°  -    Extension AROM --   55°  -    Left Lateral Flexion AROM --   40°  -    Right Lateral Flexion AROM --   25°   -    Left Rotation AROM --   55°  -    Right Rotation AROM --   52°  -    MMT (Manual Muscle Testing)    General MMT Assessment Detail B 5/5 except right flexion 4+/5 with poor effort given  -    Neck    Neck Flexion Gross Movement (5/5) normal  -    Neck Extension (5/5) normal  -    Neck Manual Muscle Testing Detail B C-Spin Rot/SB 5/5- variable effort given for all motions, assessed with Alternating Isometrics resisted by Clinician with multiple trials, some attempts gives 5/5 strength, others vary between 4/5 to 5/5  -      User Key  (r) = Recorded By, (t) = Taken By, (c) = Cosigned By    Initials Name Provider Type     Misty Panchal PTA Physical Therapy Assistant                            PT Assessment/Plan       04/25/17 0800       PT Assessment    Assessment Comments gives visibly less effort with ROM when Goniometer is gotten out to assess measurements  -     PT Plan    PT Frequency 2x/week  -     PT Plan Comments [facility] next visit  -       User Key  (r) = Recorded By, (t) = Taken By, (c) = Cosigned By    Initials Name Provider Type     Misty Panchal PTA Physical Therapy Assistant                Modalities       04/25/17 0800          Moist Heat     Applied Yes  -      Location cervical  -[facility] S/P Rx Yes  -      ELECTRICAL STIMULATION    Attended/Unattended Unattended  -      Stimulation Type IFC  -      Max mAmp 12  -      Location/Electrode Placement/Other posterior cervical/ UT  -MH      Rx Minutes 20 mins  -        User Key  (r) = Recorded By, (t) = Taken By, (c) = Cosigned By    Initials Name Provider Type     Misty Panchal PTA Physical Therapy Assistant                Exercises       04/25/17 0800          Subjective Comments    Subjective Comments states that over the weekend his neck locked up over the weekend and he couldn't even move his neck at all. couldn't do his exercises. states that he has an MD appointment tomorrow to discuss his MRI  results from the MRI that was taken last thursday.   -MH      Subjective Pain    Able to rate subjective pain? yes  -MH      Pre-Treatment Pain Level 2   rates pain 3/10 initially, later changes to 2/10  -MH      Post-Treatment Pain Level --   does not rate  -MH      Exercise 1    Exercise Name 1 Pro II UE alt F/R  -MH      Resistance 1 --   L 7.0  -MH      Time (Minutes) 1 10 min  -MH      Exercise 2    Exercise Name 2 B UT S  -MH      Reps 2 2  -MH      Time (Seconds) 2 30 sec  -MH      Exercise 3    Exercise Name 3 B LS S  -MH      Reps 3 2  -MH      Time (Seconds) 3 30 sec  -MH      Exercise 4    Exercise Name 4 Elelphant S  -MH      Reps 4 2  -MH      Time (Seconds) 4 30 sec  -MH      Exercise 5    Exercise Name 5 AROM C-Spine 6 way  -MH      Reps 5 20  -MH      Exercise 6    Exercise Name 6 C-Spine 6 way Isometrics  -MH      Reps 6 10  -MH      Time (Seconds) 6 5 sec hold  -MH      Exercise 7    Exercise Name 7 Tband Shld B Ext  -MH      Equipment 7 Theraband  -MH      Resistance 7 Green  -MH      Reps 7 20  -MH      Exercise 8    Exercise Name 8 Tband Rows Mid-Low  -MH      Equipment 8 Theraband  -MH      Resistance 8 Green  -MH      Reps 8 20  -MH      Exercise 9    Exercise Name 9 Tband No Money  -MH      Equipment 9 Theraband  -MH      Resistance 9 Green  -MH      Reps 9 20  -MH      Exercise 10    Exercise Name 10 Tband Cervical Flexion  -MH      Equipment 10 Theraband  -MH      Resistance 10 Red  -MH      Sets 10 2  -MH      Reps 10 10  -MH      Exercise 11    Exercise Name 11 Tband Cervical SB  -MH      Equipment 11 Theraband  -MH      Resistance 11 Red  -MH      Sets 11 2  -MH      Reps 11 10  -MH      Exercise 12    Exercise Name 12 Tband Clocks  -MH      Equipment 12 Theraband  -MH      Resistance 12 Green  -MH      Reps 12 20  -MH      Exercise 13    Exercise Name 13 Tband No Money  -MH      Equipment 13 Theraband  -MH      Resistance 13 Green  -MH      Reps 13 20  -MH      Exercise 14    Exercise  Name 14 Swiss Ball on Wall Push-ups  -      Reps 14 20  -      Exercise 15    Exercise Name 15 Supine Bobble Heads Small  -      Time (Minutes) 15 3 minutes  -      Exercise 16    Exercise Name 16 Manual-See Manual Tab  -        User Key  (r) = Recorded By, (t) = Taken By, (c) = Cosigned By    Initials Name Provider Type     Misty SUMA Panchal PTA Physical Therapy Assistant                        Manual Rx (last 36 hours)      Manual Treatments       04/25/17 0900          Manual Rx 1    Manual Rx 1 Location C-Spine  -      Manual Rx 1 Type Suboccipital Release  -      Manual Rx 1 Duration 10 minutes  -        User Key  (r) = Recorded By, (t) = Taken By, (c) = Cosigned By    Initials Name Provider Type     Misty MOISE OLGA Panchal Physical Therapy Assistant                PT OP Goals       04/25/17 0800       PT Short Term Goals    STG Date to Achieve 05/12/17  -     STG 3 B UE 5/5  -     STG 3 Progress Ongoing;New  -     STG 4 AROM cervical ROT 65° B.  -     STG 4 Progress Ongoing  -     STG 5 Cervical spine MMT 4+/5 or greater in all directions.  -     STG 5 Progress Met  -     STG 5 Progress Comments variable effort and inconsistent  -     Long Term Goals    LTG Date to Achieve 05/19/17  -     LTG 1 AROM cervical flexion 55° or greater.  -     LTG 1 Progress Ongoing;Goal Revised  -     LTG 2 AROM cervical SB 40° B.  -     LTG 2 Progress Ongoing  -     LTG 3 AROM cervical B ROT 75° or greater.  -     LTG 3 Progress Ongoing  -     LTG 4 Cervical spine 5/5 in all directions.  -     LTG 4 Progress Met  -     LTG 4 Progress Comments variable effort and inconsistent  -     LTG 5 Negative quadrant B for c-spine.  -     LTG 5 Progress Ongoing  -     LTG 6 NDI score of less than 20%.  -     LTG 6 Progress Ongoing  -     LTG 7 I with final HEP.  -     LTG 7 Progress Ongoing  -     LTG 8 D/C with final HEP and free 30 day fitness formula membership.  -     LTG 8  Progress Ongoing  -MH     Time Calculation    PT Goal Re-Cert Due Date 05/12/17  -MH       User Key  (r) = Recorded By, (t) = Taken By, (c) = Cosigned By    Initials Name Provider Type     Misty Panchal PTA Physical Therapy Assistant                Therapy Education       04/25/17 0800          Therapy Education    Given HEP;Symptoms/condition management;Pain management;Posture/body mechanics  -MH      Program Reinforced  -MH      How Provided Verbal  -MH      Provided to Patient  -MH      Level of Understanding Verbalized  -MH        User Key  (r) = Recorded By, (t) = Taken By, (c) = Cosigned By    Initials Name Provider Type     Misty Panchal PTA Physical Therapy Assistant                Time Calculation:   Start Time: 0800  Stop Time: 0939  Time Calculation (min): 99 min  Total Timed Code Minutes- PT: 79 minute(s)    Therapy Charges for Today     Code Description Service Date Service Provider Modifiers Qty    74807021592 HC PT THER SUPP EA 15 MIN 4/25/2017 Misty Panchal, PTA GP 1    81499490463 HC PT THER PROC EA 15 MIN 4/25/2017 Misty Panchal, PTA GP 4    03408815457 HC PT MANUAL THERAPY EA 15 MIN 4/25/2017 Misty Panchal, PTA GP 1    05411880380 HC PT ELECTRICAL STIM UNATTENDED 4/25/2017 Misty Panchal, PTA  1    03530105923 HC PT THER SUPP EA 15 MIN 4/25/2017 Misty Panchal, PTA GP 1                    Misty Panchal, PTA  4/25/2017

## 2017-04-28 ENCOUNTER — HOSPITAL ENCOUNTER (OUTPATIENT)
Dept: PHYSICAL THERAPY | Facility: HOSPITAL | Age: 32
Setting detail: THERAPIES SERIES
Discharge: HOME | End: 2017-04-28

## 2017-04-28 DIAGNOSIS — M54.2 CERVICALGIA: ICD-10-CM

## 2017-04-28 DIAGNOSIS — S16.1XXA NECK STRAIN, INITIAL ENCOUNTER: Primary | ICD-10-CM

## 2017-04-28 PROCEDURE — 97110 THERAPEUTIC EXERCISES: CPT

## 2017-04-28 NOTE — THERAPY DISCHARGE NOTE
Outpatient Physical Therapy Ortho Treatment Note/Discharge Summary  [facility]  Misty Panchal PTA       Patient Name: Ricardo Avila  : 1985  MRN: [id number]  Today's Date: 2017      Visit Date: 2017     Visits:   Insurance Visits Approved:   Recert Due: 2017  MD Appt: TBD being referred to ortho  Pain: pretreatment 1/10; post treatment 1/10  Improvement: pt is subjectively reporting 50% improvement since initial evaluation    Visit Dx:    ICD-10-CM ICD-9-CM   1. Neck strain, initial encounter S16.1XXA 847.0   2. Cervicalgia M54.2 723.1       Patient Active Problem List   Diagnosis   • Rotator cuff syndrome of right shoulder   • Other specified disorders of cartilage, shoulder   • SLAP lesion of right shoulder        Past Medical History:   Diagnosis Date   • Bronchitis, chronic    • Heart murmur    • History of ear infections    • History of strep sore throat    • Sinusitis         Past Surgical History:   Procedure Laterality Date   • SHOULDER SURGERY Right              PT Ortho       17 0800    Subjective Comments    Subjective Comments reports that radiologist states that his MRI was clear with no abnormalities. reports that the Open MRI was completed at [facility] in [place]. Reports that his insurance is not pleased with it being done as an open MRI, wants a different opinion. states that occupational medicine is referring him out to Dr. Singh so insurance says that this will be his last therapy visit for now.   -    Subjective Pain    Able to rate subjective pain? yes  -    Pre-Treatment Pain Level 1  -    Post-Treatment Pain Level 1  -    Cervical/Thoracic Special Tests    Cervical Compression (Forarminal Compression vs. Facet Pain) Right:;Positive;Left:;Negative  -    Neck    Flexion AROM WNL (0-45 degrees)   52°  -    Extension AROM WNL (0-45 degrees)   48°  -    Left Lateral Flexion AROM --   33°   -    Right Lateral Flexion AROM --   18°  -    Left Rotation AROM WNL (0-60 degrees)   72°  -    Right Rotation AROM WNL (0-60 degrees)   60°  -    MMT (Manual Muscle Testing)    General MMT Assessment Detail 5/5 bilateral UE  -    Neck    Neck Flexion Gross Movement (5/5) normal  -    Neck Extension (5/5) normal  -    Neck Manual Muscle Testing Detail B C-Spine Rot/SB 5/5   -      User Key  (r) = Recorded By, (t) = Taken By, (c) = Cosigned By    Initials Name Provider Type     Misty Panchal PTA Physical Therapy Assistant                            PT Assessment/Plan       04/28/17 0800       PT Assessment    Assessment Comments continues with variable ROM measurements. today holds a firm 5/5 for strength of c-spine in all directions as well as B UE. displays independence with HEP  -     PT Plan    PT Frequency 2x/week  -     PT Plan Comments D/C to independent management with free 30 day fitness membership  -       User Key  (r) = Recorded By, (t) = Taken By, (c) = Cosigned By    Initials Name Provider Type    GO Panchal PTA Physical Therapy Assistant                Modalities       04/28/17 0800          Moist Heat    Patient denies application of  Yes  -        User Key  (r) = Recorded By, (t) = Taken By, (c) = Cosigned By    Initials Name Provider Type    GO Panchal PTA Physical Therapy Assistant                Exercises       04/28/17 0800          Subjective Comments    Subjective Comments reports that radiologist states that his MRI was clear with no abnormalities. reports that the Open MRI was completed at [facility] in [place]. Reports that his insurance is not pleased with it being done as an open MRI, wants a different opinion. states that occupational medicine is referring him out to Dr. Singh so insurance says that this will be his last therapy visit for now.   -      Subjective Pain    Able to rate subjective pain? yes  -       Pre-Treatment Pain Level 1  -MH      Post-Treatment Pain Level 1  -MH      Exercise 1    Exercise Name 1 Pro II UE alt F/R  -MH      Resistance 1 --   L 7.0  -MH      Time (Minutes) 1 10 min  -MH      Exercise 2    Exercise Name 2 B UT S  -MH      Reps 2 2  -MH      Time (Seconds) 2 30 sec  -MH      Exercise 3    Exercise Name 3 B LS S  -MH      Reps 3 2  -MH      Time (Seconds) 3 30 sec  -MH      Exercise 4    Exercise Name 4 Elelphant S  -MH      Reps 4 2  -MH      Time (Seconds) 4 30 sec  -MH      Exercise 5    Exercise Name 5 AROM C-Spine 6 way  -MH      Reps 5 20  -MH      Exercise 6    Exercise Name 6 C-Spine 6 way Isometrics  -MH      Reps 6 10  -MH      Time (Seconds) 6 5 sec hold  -MH      Exercise 7    Exercise Name 7 Tband Shld B Ext  -MH      Equipment 7 Theraband  -MH      Resistance 7 Green  -MH      Reps 7 30  -MH      Exercise 8    Exercise Name 8 Tband Rows Mid-Low  -MH      Equipment 8 Theraband  -MH      Resistance 8 Green  -MH      Reps 8 30  -MH      Exercise 9    Exercise Name 9 Tband No Money  -MH      Equipment 9 Theraband  -MH      Resistance 9 Green  -MH      Reps 9 20  -MH      Exercise 10    Exercise Name 10 Tband Clocks  -MH      Equipment 10 Theraband  -MH      Resistance 10 Green  -MH      Reps 10 20  -MH      Exercise 11    Exercise Name 11 Swiss Ball on Wall Push Ups  -MH      Reps 11 20  -MH        User Key  (r) = Recorded By, (t) = Taken By, (c) = Cosigned By    Initials Name Provider Type     Misty Panchal, OLGA Physical Therapy Assistant                               PT OP Goals       04/28/17 0800       PT Short Term Goals    STG Date to Achieve 05/12/17  -MH     STG 3 B UE 5/5  -MH     STG 3 Progress Met  -MH     STG 4 AROM cervical ROT 65° B.  -MH     STG 4 Progress Partially Met  -MH     STG 4 Progress Comments left side met  -MH     STG 5 Cervical spine MMT 4+/5 or greater in all directions.  -MH     STG 5 Progress Met  -MH     Long Term Goals    LTG Date to Achieve  05/19/17  -     LTG 1 AROM cervical flexion 55° or greater.  -     LTG 1 Progress Met  -     LTG 1 Progress Comments within a margin of error  -     LTG 2 AROM cervical SB 40° B.  -     LTG 2 Progress Not Met  -     LTG 3 AROM cervical B ROT 75° or greater.  -     LTG 3 Progress Partially Met  -     LTG 3 Progress Comments left met within a margin of error  -     LTG 4 Cervical spine 5/5 in all directions.  -     LTG 4 Progress Met  -     LTG 5 Negative quadrant B for c-spine.  -     LTG 5 Progress Partially Met  -     LTG 5 Progress Comments right positive; left negative  -     LTG 6 NDI score of less than 20%.  -     LTG 6 Progress Ongoing  -     LTG 7 I with final HEP.  -     LTG 7 Progress Met  -     LTG 8 D/C with final HEP and free 30 day fitness formula membership.  -     LTG 8 Progress Met  -     Time Calculation    PT Goal Re-Cert Due Date 05/12/17  -       User Key  (r) = Recorded By, (t) = Taken By, (c) = Cosigned By    Initials Name Provider Type    GO Panchal PTA Physical Therapy Assistant                Therapy Education       04/28/17 0800          Therapy Education    Given HEP;Symptoms/condition management;Pain management;Posture/body mechanics  -      Program Reinforced  -      How Provided Verbal  -      Provided to Patient  -      Level of Understanding Verbalized  -        User Key  (r) = Recorded By, (t) = Taken By, (c) = Cosigned By    Initials Name Provider Type    GO Panchal PTA Physical Therapy Assistant                Outcome Measures       04/28/17 0900          Neck Disability Index    Section 1 - Pain Intensity 1  -      Section 2 - Personal Care 1  -      Section 3 - Lifting 4  -MH      Section 4 - Work 4  -MH      Section 5 - Headaches 4  -MH      Section 6 - Concentration 2  -MH      Section 7 - Sleeping 2  -MH      Section 8 - Driving 3  -MH      Section 9 - Reading 2  -MH      Section 10 - Recreation 3  -       Neck Disability Index Score 26  -      Functional Assessment    Outcome Measure Options Neck Disability Index (NDI)  -        User Key  (r) = Recorded By, (t) = Taken By, (c) = Cosigned By    Initials Name Provider Type     Misty Panchal PTA Physical Therapy Assistant            Time Calculation:   Start Time: 0800  Stop Time: 0855  Time Calculation (min): 55 min  Total Timed Code Minutes- PT: 55 minute(s)    Therapy Charges for Today     Code Description Service Date Service Provider Modifiers Qty    93239089284 HC PT THER SUPP EA 15 MIN 4/28/2017 Misty Panchal PTA GP 1    80302243242 HC PT THER PROC EA 15 MIN 4/28/2017 Misty Panchal PTA GP 4          PT G-Codes  Outcome Measure Options: Neck Disability Index (NDI)     OP PT Discharge Summary  Reason for Discharge: Independent, other (comment) (workman's comp referring to ortho, all current authorized visits used)  Outcomes Achieved: Patient able to partially acheive established goals  Discharge Destination: Home with home program  Discharge Instructions: to continue with independent management and free 30 day fitness membership      Misty Panchal PTA  4/28/2017

## 2017-05-02 ENCOUNTER — APPOINTMENT (OUTPATIENT)
Dept: PHYSICAL THERAPY | Facility: HOSPITAL | Age: 32
End: 2017-05-02

## 2017-05-05 ENCOUNTER — APPOINTMENT (OUTPATIENT)
Dept: PHYSICAL THERAPY | Facility: HOSPITAL | Age: 32
End: 2017-05-05

## 2017-05-10 ENCOUNTER — OFFICE VISIT (OUTPATIENT)
Dept: ORTHOPEDIC SURGERY | Facility: CLINIC | Age: 32
End: 2017-05-10

## 2017-05-10 VITALS — BODY MASS INDEX: 42.38 KG/M2 | HEIGHT: 67 IN | WEIGHT: 270 LBS

## 2017-05-10 DIAGNOSIS — M75.101 ROTATOR CUFF SYNDROME OF RIGHT SHOULDER: Primary | ICD-10-CM

## 2017-05-10 DIAGNOSIS — M94.8X1 OTHER SPECIFIED DISORDERS OF CARTILAGE, SHOULDER: ICD-10-CM

## 2017-05-10 DIAGNOSIS — S43.431A SLAP LESION OF RIGHT SHOULDER: ICD-10-CM

## 2017-05-10 PROCEDURE — 99213 OFFICE O/P EST LOW 20 MIN: CPT | Performed by: ORTHOPAEDIC SURGERY

## 2017-05-10 RX ORDER — SENNOSIDES 8.6 MG
650 CAPSULE ORAL EVERY 8 HOURS PRN
COMMUNITY
End: 2018-10-05

## 2017-05-10 RX ORDER — CYCLOBENZAPRINE HCL 10 MG
10 TABLET ORAL 3 TIMES DAILY PRN
COMMUNITY
End: 2018-10-05

## 2017-06-02 ENCOUNTER — OFFICE VISIT (OUTPATIENT)
Dept: ORTHOPEDIC SURGERY | Facility: CLINIC | Age: 32
End: 2017-06-02

## 2017-06-02 VITALS — BODY MASS INDEX: 42.38 KG/M2 | WEIGHT: 270 LBS | HEIGHT: 67 IN
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
## 2017-06-02 DIAGNOSIS — M54.2 CERVICALGIA: Primary | ICD-10-CM

## 2017-06-02 PROCEDURE — 99212 OFFICE O/P EST SF 10 MIN: CPT | Performed by: ORTHOPAEDIC SURGERY
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
## 2017-10-25 ENCOUNTER — APPOINTMENT (OUTPATIENT)
Dept: PHYSICAL THERAPY | Facility: HOSPITAL | Age: 32
End: 2017-10-25

## 2017-10-26 ENCOUNTER — TRANSCRIBE ORDERS (OUTPATIENT)
Dept: PHYSICAL THERAPY | Facility: HOSPITAL | Age: 32
End: 2017-10-26

## 2017-10-26 DIAGNOSIS — M76.899 BICEPS FEMORIS TENDINITIS: Primary | ICD-10-CM

## 2017-10-27 ENCOUNTER — HOSPITAL ENCOUNTER (OUTPATIENT)
Dept: PHYSICAL THERAPY | Facility: HOSPITAL | Age: 32
Setting detail: THERAPIES SERIES
Discharge: HOME | End: 2017-10-27

## 2017-10-27 DIAGNOSIS — Z98.890 STATUS POST ARTHROSCOPY OF SHOULDER: Primary | ICD-10-CM

## 2017-10-27 DIAGNOSIS — M25.511 ACUTE PAIN OF RIGHT SHOULDER: ICD-10-CM

## 2017-10-27 PROCEDURE — 97110 THERAPEUTIC EXERCISES: CPT | Performed by: PHYSICAL THERAPIST

## 2017-10-27 PROCEDURE — 97162 PT EVAL MOD COMPLEX 30 MIN: CPT | Performed by: PHYSICAL THERAPIST
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
## 2017-10-30 ENCOUNTER — HOSPITAL ENCOUNTER (OUTPATIENT)
Dept: PHYSICAL THERAPY | Facility: HOSPITAL | Age: 32
Setting detail: THERAPIES SERIES
Discharge: HOME | End: 2017-10-30

## 2017-10-30 DIAGNOSIS — M25.511 ACUTE PAIN OF RIGHT SHOULDER: ICD-10-CM

## 2017-10-30 DIAGNOSIS — Z98.890 STATUS POST ARTHROSCOPY OF SHOULDER: Primary | ICD-10-CM

## 2017-10-30 PROCEDURE — 97110 THERAPEUTIC EXERCISES: CPT | Performed by: PHYSICAL THERAPIST
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
## 2017-11-01 ENCOUNTER — HOSPITAL ENCOUNTER (OUTPATIENT)
Dept: PHYSICAL THERAPY | Facility: HOSPITAL | Age: 32
Setting detail: THERAPIES SERIES
Discharge: HOME | End: 2017-11-01

## 2017-11-01 DIAGNOSIS — M25.511 ACUTE PAIN OF RIGHT SHOULDER: ICD-10-CM

## 2017-11-01 DIAGNOSIS — Z98.890 STATUS POST ARTHROSCOPY OF SHOULDER: Primary | ICD-10-CM

## 2017-11-01 PROCEDURE — 97150 GROUP THERAPEUTIC PROCEDURES: CPT | Performed by: PHYSICAL THERAPIST
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
## 2017-11-06 ENCOUNTER — HOSPITAL ENCOUNTER (OUTPATIENT)
Dept: PHYSICAL THERAPY | Facility: HOSPITAL | Age: 32
Setting detail: THERAPIES SERIES
Discharge: HOME | End: 2017-11-06

## 2017-11-06 DIAGNOSIS — M25.511 ACUTE PAIN OF RIGHT SHOULDER: ICD-10-CM

## 2017-11-06 DIAGNOSIS — Z98.890 STATUS POST ARTHROSCOPY OF SHOULDER: Primary | ICD-10-CM

## 2017-11-06 PROCEDURE — 97110 THERAPEUTIC EXERCISES: CPT
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
## 2017-11-08 ENCOUNTER — APPOINTMENT (OUTPATIENT)
Dept: PHYSICAL THERAPY | Facility: HOSPITAL | Age: 32
End: 2017-11-08

## 2017-11-08 ENCOUNTER — HOSPITAL ENCOUNTER (OUTPATIENT)
Dept: PHYSICAL THERAPY | Facility: HOSPITAL | Age: 32
Setting detail: THERAPIES SERIES
Discharge: HOME | End: 2017-11-08

## 2017-11-08 DIAGNOSIS — Z98.890 STATUS POST ARTHROSCOPY OF SHOULDER: Primary | ICD-10-CM

## 2017-11-08 DIAGNOSIS — M25.511 ACUTE PAIN OF RIGHT SHOULDER: ICD-10-CM

## 2017-11-08 PROCEDURE — 97110 THERAPEUTIC EXERCISES: CPT
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
## 2017-11-13 ENCOUNTER — HOSPITAL ENCOUNTER (OUTPATIENT)
Dept: PHYSICAL THERAPY | Facility: HOSPITAL | Age: 32
Setting detail: THERAPIES SERIES
Discharge: HOME | End: 2017-11-13

## 2017-11-13 DIAGNOSIS — M25.511 ACUTE PAIN OF RIGHT SHOULDER: ICD-10-CM

## 2017-11-13 DIAGNOSIS — Z98.890 STATUS POST ARTHROSCOPY OF SHOULDER: Primary | ICD-10-CM

## 2017-11-13 PROCEDURE — 97110 THERAPEUTIC EXERCISES: CPT
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
## 2017-11-17 ENCOUNTER — HOSPITAL ENCOUNTER (OUTPATIENT)
Dept: PHYSICAL THERAPY | Facility: HOSPITAL | Age: 32
Setting detail: THERAPIES SERIES
Discharge: HOME | End: 2017-11-17

## 2017-11-17 DIAGNOSIS — Z98.890 STATUS POST ARTHROSCOPY OF SHOULDER: Primary | ICD-10-CM

## 2017-11-17 DIAGNOSIS — M25.511 ACUTE PAIN OF RIGHT SHOULDER: ICD-10-CM

## 2017-11-17 PROCEDURE — 97110 THERAPEUTIC EXERCISES: CPT | Performed by: PHYSICAL THERAPIST
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
## 2017-11-20 ENCOUNTER — HOSPITAL ENCOUNTER (OUTPATIENT)
Dept: PHYSICAL THERAPY | Facility: HOSPITAL | Age: 32
Setting detail: THERAPIES SERIES
Discharge: HOME | End: 2017-11-20

## 2017-11-20 DIAGNOSIS — M25.511 ACUTE PAIN OF RIGHT SHOULDER: ICD-10-CM

## 2017-11-20 DIAGNOSIS — Z98.890 STATUS POST ARTHROSCOPY OF SHOULDER: Primary | ICD-10-CM

## 2017-11-20 PROCEDURE — 97110 THERAPEUTIC EXERCISES: CPT
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
## 2017-11-27 ENCOUNTER — HOSPITAL ENCOUNTER (OUTPATIENT)
Dept: PHYSICAL THERAPY | Facility: HOSPITAL | Age: 32
Setting detail: THERAPIES SERIES
Discharge: HOME | End: 2017-11-27

## 2017-11-27 DIAGNOSIS — M25.511 ACUTE PAIN OF RIGHT SHOULDER: ICD-10-CM

## 2017-11-27 DIAGNOSIS — Z98.890 STATUS POST ARTHROSCOPY OF SHOULDER: Primary | ICD-10-CM

## 2017-11-27 PROCEDURE — 97110 THERAPEUTIC EXERCISES: CPT
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
## 2017-11-29 ENCOUNTER — HOSPITAL ENCOUNTER (OUTPATIENT)
Dept: PHYSICAL THERAPY | Facility: HOSPITAL | Age: 32
Setting detail: THERAPIES SERIES
Discharge: HOME | End: 2017-11-29

## 2017-11-29 DIAGNOSIS — Z98.890 STATUS POST ARTHROSCOPY OF SHOULDER: Primary | ICD-10-CM

## 2017-11-29 DIAGNOSIS — M25.511 ACUTE PAIN OF RIGHT SHOULDER: ICD-10-CM

## 2017-11-29 PROCEDURE — 97110 THERAPEUTIC EXERCISES: CPT
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
## 2017-12-05 ENCOUNTER — HOSPITAL ENCOUNTER (OUTPATIENT)
Dept: PHYSICAL THERAPY | Facility: HOSPITAL | Age: 32
Setting detail: THERAPIES SERIES
Discharge: HOME | End: 2017-12-05

## 2017-12-05 DIAGNOSIS — M25.511 ACUTE PAIN OF RIGHT SHOULDER: ICD-10-CM

## 2017-12-05 DIAGNOSIS — Z98.890 STATUS POST ARTHROSCOPY OF SHOULDER: Primary | ICD-10-CM

## 2017-12-05 PROCEDURE — 97110 THERAPEUTIC EXERCISES: CPT
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
## 2017-12-07 ENCOUNTER — HOSPITAL ENCOUNTER (OUTPATIENT)
Dept: PHYSICAL THERAPY | Facility: HOSPITAL | Age: 32
Setting detail: THERAPIES SERIES
Discharge: HOME | End: 2017-12-07

## 2017-12-07 DIAGNOSIS — M25.511 ACUTE PAIN OF RIGHT SHOULDER: ICD-10-CM

## 2017-12-07 DIAGNOSIS — Z98.890 STATUS POST ARTHROSCOPY OF SHOULDER: Primary | ICD-10-CM

## 2017-12-07 PROCEDURE — 97110 THERAPEUTIC EXERCISES: CPT | Performed by: PHYSICAL THERAPIST
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
## 2018-10-04 DIAGNOSIS — M25.511 RIGHT SHOULDER PAIN, UNSPECIFIED CHRONICITY: Primary | ICD-10-CM

## 2018-10-05 ENCOUNTER — OFFICE VISIT (OUTPATIENT)
Dept: ORTHOPEDIC SURGERY | Facility: CLINIC | Age: 33
End: 2018-10-05

## 2018-10-05 VITALS — HEIGHT: 67 IN | WEIGHT: 237 LBS | BODY MASS INDEX: 37.2 KG/M2

## 2018-10-05 DIAGNOSIS — M94.8X1 OTHER SPECIFIED DISORDERS OF CARTILAGE, SHOULDER: ICD-10-CM

## 2018-10-05 DIAGNOSIS — M75.101 ROTATOR CUFF SYNDROME OF RIGHT SHOULDER: ICD-10-CM

## 2018-10-05 DIAGNOSIS — M25.511 RIGHT SHOULDER PAIN, UNSPECIFIED CHRONICITY: Primary | ICD-10-CM

## 2018-10-05 PROCEDURE — 99213 OFFICE O/P EST LOW 20 MIN: CPT | Performed by: ORTHOPAEDIC SURGERY

## 2018-10-05 RX ORDER — NABUMETONE 750 MG/1
750 TABLET, FILM COATED ORAL 2 TIMES DAILY
Qty: 60 TABLET | Refills: 2 | Status: SHIPPED | OUTPATIENT
Start: 2018-10-05 | End: 2022-03-07

## 2018-10-05 NOTE — PROGRESS NOTES
"Ricardo Avila is a 33 y.o. male returns for     Chief Complaint   Patient presents with   • Right Shoulder - Follow-up       HISTORY OF PRESENT ILLNESS: Follow up on right shoulder pain. Patient had xrays today in the office. Not currently doing PT. [facility]       33 y right shoulder pain, present for 2 months, constant, [facility].  Pain does not radiate. Sharp.  Onset variable.  + locking/ catching.  No fevers, no chills, occasional nighttime awakening pain.        CONCURRENT MEDICAL HISTORY:    The following portions of the patient's history were reviewed and updated as appropriate: allergies, current medications, past family history, past medical history, past social history, past surgical history and problem list.     ROS  No fevers or chills.  No chest pain or shortness of air.  No GI or  disturbances.    PHYSICAL EXAMINATION:       Ht 170.2 cm (67\")   Wt 108 kg (237 lb)   BMI 37.12 kg/m²     Physical Exam   Constitutional: He appears well-developed.   HENT:   Head: Normocephalic and atraumatic.   Eyes: Pupils are equal, round, and reactive to light. Right eye exhibits no discharge. Left eye exhibits no discharge.   Neck: Normal range of motion. No JVD present. No tracheal deviation present. No thyromegaly present.   Cardiovascular: Normal rate and intact distal pulses.  Exam reveals no gallop and no friction rub.    No murmur heard.  Pulmonary/Chest: Effort normal and breath sounds normal. No respiratory distress. He has no wheezes. He has no rales.   Abdominal: Soft. Bowel sounds are normal. He exhibits no distension. There is no tenderness. There is no guarding.   Musculoskeletal: He exhibits no edema, tenderness or deformity.   Lymphadenopathy:     He has no cervical adenopathy.   Neurological: He is alert. He has normal reflexes. He displays normal reflexes. No cranial nerve deficit. Coordination normal.   Skin: Skin is warm. No rash " noted. No erythema.   Psychiatric: He has a normal mood and affect. His behavior is normal. Thought content normal.       GAIT:     []  Normal  []  Antalgic    Assistive device: []  None  []  Walker     []  Crutches  []  Cane     []  Wheelchair  []  Stretcher    Right Shoulder Exam     Tenderness   The patient is experiencing tenderness in the acromioclavicular joint and acromion.    Range of Motion   Active Abduction: 120   Passive Abduction: 150   Forward Flexion: 160     Muscle Strength   Abduction: 5/5   Internal Rotation: 5/5   External Rotation: 5/5   Supraspinatus: 5/5   Subscapularis: 5/5     Tests   Apprehension: negative  Cross Arm: negative  Hawkin's test: positive  Impingement: positive    Other   Erythema: absent    Comments:  Positive Morin's              Xr Shoulder 2+ View Right    Result Date: 10/5/2018  Narrative: Ordering Provider:  Isidro Bowens MD Ordering Diagnosis/Indication:  Right shoulder pain, unspecified chronicity Procedure:  XR SHOULDER 2+ VW RIGHT Exam Date:  10/5/18 RELEVANT PRIOR IMAGES:  SCANNED - IMAGING 04/20/2017  Final COMPARISON:  Not applicable, no relevant images available.     Impression:   Decreased joint space glenohumeral joint, acromion down sloping.  Bone quality: good, small radiolucency of proximal humeral metaphysis Alignment: glenohumeral joint Fracture: none Soft tissue: normal.       Other Result ReportsCollapse All  2017 February  MRI shoulder right arthrogram with contrast2/10/2017  [facility]  Result Impression       1.  No labral abnormalities.    2.  Mild supraspinatus tendinosis.   Result Narrative   Indication:  Pain with decreased range of motion for 2-3 weeks; rotator cuff tear in the past.    MRI, Right Shoulder following Arthrogram:  No marrow edema or sign of bone injury is seen.  There is discrete subchondral signal change in the greater tuberosity that is probably from old injury.  The rotator cuff is not torn.  The supraspinatus  tendon      X-ray arthrogram shoulder right2/10/2017  [facility]  Result Narrative   Indication:  Right shoulder pain.    Right Shoulder Arthrogram:    Procedure:  Following standard sterile precautions and local anesthesia, fluoroscopy was used to guide the placement of a 20-gauge spinal needle in the right shoulder joint.  3 cc of Lidocaine and 8 mL of gadolinium diluted in saline was injected.  Spot   films were obtained.  There was 54 seconds of fluoroscopy.    Findings:  The rotator cuff appears intact.       Mri of Cspine done on 04/70/2017  Impression: normal     ASSESSMENT:    Diagnoses and all orders for this visit:    Right shoulder pain, unspecified chronicity  -     MRI Shoulder Right Arthrogram; Future    Rotator cuff syndrome of right shoulder  -     MRI Shoulder Right Arthrogram; Future    Other specified disorders of cartilage, shoulder    Other orders  -     nabumetone (RELAFEN) 750 MG tablet; Take 1 tablet by mouth 2 (Two) Times a Day.          PLAN    Recommend MRI of shoulder with arthrogram  Prescription written for relafen.        Isidro Bowens MD

## 2018-10-25 ENCOUNTER — APPOINTMENT (OUTPATIENT)
Dept: MRI IMAGING | Facility: HOSPITAL | Age: 33
End: 2018-10-25

## 2018-10-30 ENCOUNTER — HOSPITAL ENCOUNTER (OUTPATIENT)
Dept: MRI IMAGING | Facility: HOSPITAL | Age: 33
Discharge: HOME | End: 2018-10-30
Admitting: ORTHOPAEDIC SURGERY

## 2018-10-30 ENCOUNTER — HOSPITAL ENCOUNTER (OUTPATIENT)
Dept: CT IMAGING | Facility: HOSPITAL | Age: 33
Discharge: HOME | End: 2018-10-30

## 2018-10-30 DIAGNOSIS — M25.511 RIGHT SHOULDER PAIN, UNSPECIFIED CHRONICITY: ICD-10-CM

## 2018-10-30 DIAGNOSIS — M75.101 ROTATOR CUFF SYNDROME OF RIGHT SHOULDER: ICD-10-CM

## 2018-10-30 PROCEDURE — 77002 NEEDLE LOCALIZATION BY XRAY: CPT

## 2018-10-30 PROCEDURE — A9576 INJ PROHANCE MULTIPACK: HCPCS | Performed by: RADIOLOGY

## 2018-10-30 PROCEDURE — 25010000002 TRIAMCINOLONE PER 10 MG: Performed by: RADIOLOGY

## 2018-10-30 PROCEDURE — 25010000002 GADOTERIDOL PER 1 ML: Performed by: RADIOLOGY

## 2018-10-30 PROCEDURE — 25010000002 IOPAMIDOL 61 % SOLUTION: Performed by: RADIOLOGY

## 2018-10-30 PROCEDURE — 73222 MRI JOINT UPR EXTREM W/DYE: CPT

## 2018-10-30 PROCEDURE — 77012 CT SCAN FOR NEEDLE BIOPSY: CPT

## 2018-10-30 RX ORDER — BUPIVACAINE HYDROCHLORIDE 2.5 MG/ML
2 INJECTION, SOLUTION INFILTRATION; PERINEURAL ONCE
Status: COMPLETED | OUTPATIENT
Start: 2018-10-30 | End: 2018-10-30

## 2018-10-30 RX ORDER — TRIAMCINOLONE ACETONIDE 40 MG/ML
80 INJECTION, SUSPENSION INTRA-ARTICULAR; INTRAMUSCULAR ONCE
Status: COMPLETED | OUTPATIENT
Start: 2018-10-30 | End: 2018-10-30

## 2018-10-30 RX ADMIN — GADOTERIDOL 1 ML: 279.3 INJECTION, SOLUTION INTRAVENOUS at 13:15

## 2018-10-30 RX ADMIN — TRIAMCINOLONE ACETONIDE 80 MG: 40 INJECTION, SUSPENSION INTRA-ARTICULAR; INTRAMUSCULAR at 12:55

## 2018-10-30 RX ADMIN — BUPIVACAINE HYDROCHLORIDE 2 ML: 2.5 INJECTION, SOLUTION INFILTRATION; PERINEURAL at 12:58

## 2018-10-30 RX ADMIN — IOPAMIDOL 3 ML: 612 INJECTION, SOLUTION INTRAVENOUS at 13:16

## 2018-10-30 NOTE — PRE-PROCEDURE NOTE
33 year old male. 2 prior shoulder surgeries. Pain and limitation of motion. Referred for ct guided injection prior to MRI arthrogram. Procedure explained. Consent obtained. Risks and benefits advised.

## 2018-11-02 ENCOUNTER — OFFICE VISIT (OUTPATIENT)
Dept: ORTHOPEDIC SURGERY | Facility: CLINIC | Age: 33
End: 2018-11-02

## 2018-11-02 VITALS — HEIGHT: 67 IN | BODY MASS INDEX: 37.98 KG/M2 | WEIGHT: 242 LBS

## 2018-11-02 DIAGNOSIS — M54.2 CERVICALGIA: ICD-10-CM

## 2018-11-02 DIAGNOSIS — M94.8X1 OTHER SPECIFIED DISORDERS OF CARTILAGE, SHOULDER: ICD-10-CM

## 2018-11-02 DIAGNOSIS — M75.101 ROTATOR CUFF SYNDROME OF RIGHT SHOULDER: ICD-10-CM

## 2018-11-02 DIAGNOSIS — M25.511 RIGHT SHOULDER PAIN, UNSPECIFIED CHRONICITY: Primary | ICD-10-CM

## 2018-11-02 PROCEDURE — 99215 OFFICE O/P EST HI 40 MIN: CPT | Performed by: ORTHOPAEDIC SURGERY

## 2018-11-02 NOTE — PROGRESS NOTES
"Ricardo Avila is a 33 y.o. male returns for     Chief Complaint   Patient presents with   • Right Shoulder - Follow-up       HISTORY OF PRESENT ILLNESS: Patient being seen for right shoulder follow up.   33 y right shoulder pain, present for 2 months, constant, [facility].   States pain improved in the right shoulder initially, however symptoms began to deteriorate a few months ago.   Pain does not radiate. Sharp.  Onset variable.  + locking/ catching.  No fevers, no chills, occasional nighttime awakening pain.             CONCURRENT MEDICAL HISTORY:    The following portions of the patient's history were reviewed and updated as appropriate: allergies, current medications, past family history, past medical history, past social history, past surgical history and problem list.     ROS  No fevers or chills.  No chest pain or shortness of air.  No GI or  disturbances.    PHYSICAL EXAMINATION:       Ht 170.2 cm (67\")   Wt 110 kg (242 lb)   BMI 37.90 kg/m²     Physical Exam   Constitutional: He appears well-developed.   HENT:   Head: Normocephalic and atraumatic.   Eyes: Pupils are equal, round, and reactive to light. Right eye exhibits no discharge. Left eye exhibits no discharge.   Neck: Normal range of motion. No JVD present. No tracheal deviation present. No thyromegaly present.   Cardiovascular: Normal rate and intact distal pulses.  Exam reveals no gallop and no friction rub.    No murmur heard.  Pulmonary/Chest: Effort normal and breath sounds normal. No respiratory distress. He has no wheezes. He has no rales.   Abdominal: Soft. Bowel sounds are normal. He exhibits no distension. There is no tenderness. There is no guarding.   Musculoskeletal: He exhibits no edema, tenderness or deformity.   Lymphadenopathy:     He has no cervical adenopathy.   Neurological: He is alert. He has normal reflexes. He displays normal reflexes. No cranial nerve deficit. Coordination normal.   Skin: " Skin is warm. No rash noted. No erythema.   Psychiatric: He has a normal mood and affect. His behavior is normal. Thought content normal.       GAIT:     [x]  Normal  []  Antalgic    Assistive device: [x]  None  []  Walker     []  Crutches  []  Cane     []  Wheelchair  []  Stretcher    Right Shoulder Exam     Tenderness   The patient is experiencing tenderness in the acromioclavicular joint and acromion.    Range of Motion   Active Abduction: 120   Passive Abduction: 150   Forward Flexion: 160     Muscle Strength   Abduction: 5/5   Internal Rotation: 5/5   External Rotation: 5/5   Supraspinatus: 5/5   Subscapularis: 5/5     Tests   Apprehension: negative  Cross Arm: negative  Hawkin's test: positive  Impingement: positive    Other   Erythema: absent    Comments:  Positive Morin's              Ct Guided Contrast Injection Joint    Result Date: 10/30/2018  Narrative: CT guided right shoulder injection prior to MR arthrography. Therapeutic injection. MRI arthrogram right shoulder. CLINICAL INDICATION: 33-year-old male with pain and limitation of motion. Two prior shoulder surgeries.   COMPARISON: Right shoulder October 5, 2020 2018.   TECHNIQUE: Noncontrast study. Helical scanning with axial and coronal reformations. Delayed kidney images Soft tissue, lung, and bone windows reviewed. This exam was performed according to our departmental dose-optimization program, which includes automated exposure control, adjustment of the mA and/or kV according to patient size and/or use of iterative reconstruction technique. CT injection: Under CT guidance a path was selected for glenohumeral joint injection. Two drops of ProHance, 2 mL of Isovue-300, 2 mL of sterile saline, 2 mL of intermediate strength bupivacaine, 2 mL of Kenalog and a small amount of 1% lidocaine were injected without difficulty into the glenohumeral joint. CT images demonstrate a satisfactory intra-articular injection.     Impression: CONCLUSION: Technically  successful and uneventful injection right shoulder under CT guidance prior to MR arthrography. MR arthrogram right shoulder: Multiplanar multisequence images of the right shoulder obtained following contrast injection of dilute gadolinium into the glenohumeral joint. The long head of the biceps tendon is absent within the intertubercular sulcus suggesting long head biceps disruption. Small obliquely oriented tear posterior labrum series 3 image eight. Abnormal superior labrum suggesting complex tear and/or postsurgical changes. (Series 3 images 8-12.) Normal subscapularis tendon. Normal supraspinatous and infraspinatus tendons. No evidence of a full-thickness or partial tear. There is a comminuted joint arthrosis this causes moderate underlying impingement. Series 3 image seven. There are degenerative changes of the glenohumeral joint with osteophytes arising from the inferomedial aspect humeral head. Subchondral cystic changes of arthritic origin superior lateral aspect the humeral head. CONCLUSION: 1. Absence of the biceps tendon within the intertubercular sulcus therefore indicating long head of biceps tendon disruption. 2. Subtle oblique oriented tear posterior labrum. Markedly abnormal superior labrum suggesting complex tear and/or postsurgical changes. 3. Normal rotator cuff. No evidence of a full or partial thickness tear. 4. Acromioclavicular joint arthrosis causing moderate underlying impingement. Mild degenerative changes glenohumeral joint with osteophyte production involving the inferomedial aspect of the humeral head. Electronically signed by:  Manjinder Jaimes MD  10/30/2018 2:53 PM CDT Workstation: [id number]    Mri Shoulder Right Arthrogram    Result Date: 10/30/2018  Narrative: CT guided right shoulder injection prior to MR arthrography. Therapeutic injection. MRI arthrogram right shoulder. CLINICAL INDICATION: 33-year-old male with pain and limitation of motion. Two prior shoulder surgeries.   COMPARISON:  Right shoulder October 5, 2020 2018.   TECHNIQUE: Noncontrast study. Helical scanning with axial and coronal reformations. Delayed kidney images Soft tissue, lung, and bone windows reviewed. This exam was performed according to our departmental dose-optimization program, which includes automated exposure control, adjustment of the mA and/or kV according to patient size and/or use of iterative reconstruction technique. CT injection: Under CT guidance a path was selected for glenohumeral joint injection. Two drops of ProHance, 2 mL of Isovue-300, 2 mL of sterile saline, 2 mL of intermediate strength bupivacaine, 2 mL of Kenalog and a small amount of 1% lidocaine were injected without difficulty into the glenohumeral joint. CT images demonstrate a satisfactory intra-articular injection.     Impression: CONCLUSION: Technically successful and uneventful injection right shoulder under CT guidance prior to MR arthrography. MR arthrogram right shoulder: Multiplanar multisequence images of the right shoulder obtained following contrast injection of dilute gadolinium into the glenohumeral joint. The long head of the biceps tendon is absent within the intertubercular sulcus suggesting long head biceps disruption. Small obliquely oriented tear posterior labrum series 3 image eight. Abnormal superior labrum suggesting complex tear and/or postsurgical changes. (Series 3 images 8-12.) Normal subscapularis tendon. Normal supraspinatous and infraspinatus tendons. No evidence of a full-thickness or partial tear. There is a comminuted joint arthrosis this causes moderate underlying impingement. Series 3 image seven. There are degenerative changes of the glenohumeral joint with osteophytes arising from the inferomedial aspect humeral head. Subchondral cystic changes of arthritic origin superior lateral aspect the humeral head. CONCLUSION: 1. Absence of the biceps tendon within the intertubercular sulcus therefore indicating long head  of biceps tendon disruption. 2. Subtle oblique oriented tear posterior labrum. Markedly abnormal superior labrum suggesting complex tear and/or postsurgical changes. 3. Normal rotator cuff. No evidence of a full or partial thickness tear. 4. Acromioclavicular joint arthrosis causing moderate underlying impingement. Mild degenerative changes glenohumeral joint with osteophyte production involving the inferomedial aspect of the humeral head. Electronically signed by:  Manjinder Jaimes MD  10/30/2018 2:53 PM CDT Workstation: [id number]      I reviewed the MRI of right shoulder, there is no indication of rotator cuff tear, some joint arthritis is notable.,  Biceps tenodesis is evident, suture anchors from labral repair are present.  Possible impingement of the subacromial zone.          ASSESSMENT:    Diagnoses and all orders for this visit:    Right shoulder pain, unspecified chronicity  -     Case Request; Standing  -     ceFAZolin (ANCEF) 2 g in sodium chloride 0.9 % 100 mL IVPB; Infuse 2 g into a venous catheter 1 (One) Time.  -     Case Request    Rotator cuff syndrome of right shoulder  -     Case Request; Standing  -     ceFAZolin (ANCEF) 2 g in sodium chloride 0.9 % 100 mL IVPB; Infuse 2 g into a venous catheter 1 (One) Time.  -     Case Request    Other specified disorders of cartilage, shoulder    Cervicalgia    Other orders  -     Obtain informed consent; Future  -     Obtain Informed Consent; Standing  -     aPTT; Standing  -     Basic Metabolic Panel; Standing  -     CBC (No Diff); Standing  -     ECG 12 Lead; Standing  -     XR Chest PA & Lateral; Standing  -     Protime-INR; Standing          PLAN    Recommend for right shoulder arthroscopy for shoulder decompression and assessment of labral injury.  I discussed with him possible labral repair, possible subacromial decompression and AC joint resection.   I reviewed with him risks of surgery including the risk of infection, hematoma, wound healing complications  instability, worsening of pain, nerve or vascular injury, loss of functional use, compartment syndrome, possible revision surgery or additional surgery.  All questions are answered, no guarantees are given.           Isidro Bowens MD

## 2018-11-14 RX ORDER — BUPIVACAINE HCL/0.9 % NACL/PF 0.1 %
2 PLASTIC BAG, INJECTION (ML) EPIDURAL ONCE
Status: CANCELLED | OUTPATIENT
Start: 2018-12-06 | End: 2018-11-14

## 2018-11-26 ENCOUNTER — TELEPHONE (OUTPATIENT)
Dept: ORTHOPEDIC SURGERY | Facility: CLINIC | Age: 33
End: 2018-11-26

## 2018-11-28 PROBLEM — M25.511 RIGHT SHOULDER PAIN: Status: ACTIVE | Noted: 2018-11-28

## 2018-11-30 ENCOUNTER — APPOINTMENT (OUTPATIENT)
Dept: PREADMISSION TESTING | Facility: HOSPITAL | Age: 33
End: 2018-11-30

## 2018-11-30 ENCOUNTER — HOSPITAL ENCOUNTER (OUTPATIENT)
Dept: GENERAL RADIOLOGY | Facility: HOSPITAL | Age: 33
Discharge: HOME | End: 2018-11-30
Admitting: ORTHOPAEDIC SURGERY

## 2018-11-30 VITALS
HEART RATE: 81 BPM | WEIGHT: 235 LBS | DIASTOLIC BLOOD PRESSURE: 78 MMHG | HEIGHT: 67 IN | SYSTOLIC BLOOD PRESSURE: 112 MMHG | RESPIRATION RATE: 12 BRPM | OXYGEN SATURATION: 98 % | BODY MASS INDEX: 36.88 KG/M2

## 2018-11-30 LAB
ANION GAP SERPL CALCULATED.3IONS-SCNC: 12 MMOL/L (ref 5–15)
APTT PPP: 26.1 SECONDS (ref 20–40.3)
BUN BLD-MCNC: 10 MG/DL (ref 7–21)
BUN/CREAT SERPL: 9.3 (ref 7–25)
CALCIUM SPEC-SCNC: 9.8 MG/DL (ref 8.4–10.2)
CHLORIDE SERPL-SCNC: 101 MMOL/L (ref 95–110)
CO2 SERPL-SCNC: 28 MMOL/L (ref 22–31)
CREAT BLD-MCNC: 1.07 MG/DL (ref 0.7–1.3)
DEPRECATED RDW RBC AUTO: 41.6 FL (ref 35.1–43.9)
ERYTHROCYTE [DISTWIDTH] IN BLOOD BY AUTOMATED COUNT: 13.1 % (ref 11.5–14.5)
GFR SERPL CREATININE-BSD FRML MDRD: 79 ML/MIN/1.73 (ref 70–162)
GLUCOSE BLD-MCNC: 86 MG/DL (ref 60–100)
HCT VFR BLD AUTO: 46.7 % (ref 39–49)
HGB BLD-MCNC: 16.2 G/DL (ref 13.7–17.3)
INR PPP: 0.93 (ref 0.8–1.2)
MCH RBC QN AUTO: 30.3 PG (ref 26.5–34)
MCHC RBC AUTO-ENTMCNC: 34.7 G/DL (ref 31.5–36.3)
MCV RBC AUTO: 87.3 FL (ref 80–98)
PLATELET # BLD AUTO: 179 10*3/MM3 (ref 150–450)
PMV BLD AUTO: 10.6 FL (ref 8–12)
POTASSIUM BLD-SCNC: 4.1 MMOL/L (ref 3.5–5.1)
PROTHROMBIN TIME: 12.3 SECONDS (ref 11.1–15.3)
RBC # BLD AUTO: 5.35 10*6/MM3 (ref 4.37–5.74)
SODIUM BLD-SCNC: 141 MMOL/L (ref 137–145)
WBC NRBC COR # BLD: 5.36 10*3/MM3 (ref 3.2–9.8)

## 2018-11-30 PROCEDURE — 85730 THROMBOPLASTIN TIME PARTIAL: CPT | Performed by: ORTHOPAEDIC SURGERY

## 2018-11-30 PROCEDURE — 71046 X-RAY EXAM CHEST 2 VIEWS: CPT

## 2018-11-30 PROCEDURE — 93010 ELECTROCARDIOGRAM REPORT: CPT | Performed by: INTERNAL MEDICINE

## 2018-11-30 PROCEDURE — 80048 BASIC METABOLIC PNL TOTAL CA: CPT | Performed by: ORTHOPAEDIC SURGERY

## 2018-11-30 PROCEDURE — 85610 PROTHROMBIN TIME: CPT | Performed by: ORTHOPAEDIC SURGERY

## 2018-11-30 PROCEDURE — 93005 ELECTROCARDIOGRAM TRACING: CPT | Performed by: ORTHOPAEDIC SURGERY

## 2018-11-30 PROCEDURE — 85027 COMPLETE CBC AUTOMATED: CPT | Performed by: ORTHOPAEDIC SURGERY

## 2018-11-30 RX ORDER — CHOLECALCIFEROL (VITAMIN D3) 125 MCG
500 CAPSULE ORAL DAILY
COMMUNITY
End: 2022-03-07

## 2018-11-30 RX ORDER — PROMETHAZINE HYDROCHLORIDE 25 MG/1
25 TABLET ORAL EVERY 6 HOURS PRN
COMMUNITY

## 2018-11-30 RX ORDER — DOXEPIN HYDROCHLORIDE 75 MG/1
75 CAPSULE ORAL NIGHTLY
COMMUNITY

## 2018-11-30 RX ORDER — SUMATRIPTAN 100 MG/1
100 TABLET, FILM COATED ORAL
COMMUNITY

## 2018-11-30 RX ORDER — PRAZOSIN HYDROCHLORIDE 1 MG/1
1 CAPSULE ORAL NIGHTLY
COMMUNITY

## 2018-11-30 RX ORDER — SODIUM CHLORIDE, SODIUM GLUCONATE, SODIUM ACETATE, POTASSIUM CHLORIDE, AND MAGNESIUM CHLORIDE 526; 502; 368; 37; 30 MG/100ML; MG/100ML; MG/100ML; MG/100ML; MG/100ML
1000 INJECTION, SOLUTION INTRAVENOUS CONTINUOUS
Status: CANCELLED | OUTPATIENT
Start: 2018-12-06

## 2018-12-05 ENCOUNTER — ANESTHESIA EVENT (OUTPATIENT)
Dept: PERIOP | Facility: HOSPITAL | Age: 33
End: 2018-12-05

## 2018-12-06 ENCOUNTER — HOSPITAL ENCOUNTER (OUTPATIENT)
Facility: HOSPITAL | Age: 33
Setting detail: HOSPITAL OUTPATIENT SURGERY
Discharge: HOME | End: 2018-12-06
Attending: ORTHOPAEDIC SURGERY | Admitting: ORTHOPAEDIC SURGERY

## 2018-12-06 ENCOUNTER — ANESTHESIA (OUTPATIENT)
Dept: PERIOP | Facility: HOSPITAL | Age: 33
End: 2018-12-06

## 2018-12-06 VITALS
HEIGHT: 67 IN | HEART RATE: 112 BPM | DIASTOLIC BLOOD PRESSURE: 88 MMHG | RESPIRATION RATE: 18 BRPM | WEIGHT: 228.84 LBS | OXYGEN SATURATION: 95 % | TEMPERATURE: 97.1 F | SYSTOLIC BLOOD PRESSURE: 133 MMHG | BODY MASS INDEX: 35.92 KG/M2

## 2018-12-06 DIAGNOSIS — M75.101 ROTATOR CUFF SYNDROME OF RIGHT SHOULDER: ICD-10-CM

## 2018-12-06 DIAGNOSIS — M25.511 RIGHT SHOULDER PAIN, UNSPECIFIED CHRONICITY: ICD-10-CM

## 2018-12-06 PROCEDURE — 25010000002 FENTANYL CITRATE (PF) 100 MCG/2ML SOLUTION: Performed by: NURSE ANESTHETIST, CERTIFIED REGISTERED

## 2018-12-06 PROCEDURE — 25010000002 MIDAZOLAM PER 1 MG: Performed by: NURSE ANESTHETIST, CERTIFIED REGISTERED

## 2018-12-06 PROCEDURE — 25010000002 ONDANSETRON PER 1 MG: Performed by: NURSE ANESTHETIST, CERTIFIED REGISTERED

## 2018-12-06 PROCEDURE — 25010000002 DEXAMETHASONE PER 1 MG: Performed by: NURSE ANESTHETIST, CERTIFIED REGISTERED

## 2018-12-06 PROCEDURE — 25010000002 EPINEPHRINE PER 0.1 MG: Performed by: ORTHOPAEDIC SURGERY

## 2018-12-06 PROCEDURE — 25010000002 HYDROMORPHONE 1 MG/ML SOLUTION: Performed by: NURSE ANESTHETIST, CERTIFIED REGISTERED

## 2018-12-06 PROCEDURE — 25010000002 PROPOFOL 10 MG/ML EMULSION: Performed by: NURSE ANESTHETIST, CERTIFIED REGISTERED

## 2018-12-06 PROCEDURE — 29820 SHO ARTHRS SRG PRTL SYNVCT: CPT | Performed by: ORTHOPAEDIC SURGERY

## 2018-12-06 PROCEDURE — 29826 SHO ARTHRS SRG DECOMPRESSION: CPT | Performed by: ORTHOPAEDIC SURGERY

## 2018-12-06 PROCEDURE — 25010000002 PHENYLEPHRINE PER 1 ML: Performed by: NURSE ANESTHETIST, CERTIFIED REGISTERED

## 2018-12-06 PROCEDURE — 25010000002 NEOSTIGMINE 4 MG/4ML SOLUTION PREFILLED SYRINGE: Performed by: NURSE ANESTHETIST, CERTIFIED REGISTERED

## 2018-12-06 PROCEDURE — 25010000002 ROPIVACAINE PER 1 MG: Performed by: ANESTHESIOLOGY

## 2018-12-06 RX ORDER — EPINEPHRINE 1 MG/ML
INJECTION, SOLUTION, CONCENTRATE INTRAVENOUS AS NEEDED
Status: DISCONTINUED | OUTPATIENT
Start: 2018-12-06 | End: 2018-12-06 | Stop reason: HOSPADM

## 2018-12-06 RX ORDER — FENTANYL CITRATE 50 UG/ML
INJECTION, SOLUTION INTRAMUSCULAR; INTRAVENOUS AS NEEDED
Status: DISCONTINUED | OUTPATIENT
Start: 2018-12-06 | End: 2018-12-06 | Stop reason: SURG

## 2018-12-06 RX ORDER — MIDAZOLAM HYDROCHLORIDE 1 MG/ML
INJECTION INTRAMUSCULAR; INTRAVENOUS AS NEEDED
Status: DISCONTINUED | OUTPATIENT
Start: 2018-12-06 | End: 2018-12-06 | Stop reason: SURG

## 2018-12-06 RX ORDER — GLYCOPYRROLATE 0.2 MG/ML
INJECTION INTRAMUSCULAR; INTRAVENOUS AS NEEDED
Status: DISCONTINUED | OUTPATIENT
Start: 2018-12-06 | End: 2018-12-06 | Stop reason: SURG

## 2018-12-06 RX ORDER — ONDANSETRON 2 MG/ML
INJECTION INTRAMUSCULAR; INTRAVENOUS AS NEEDED
Status: DISCONTINUED | OUTPATIENT
Start: 2018-12-06 | End: 2018-12-06 | Stop reason: SURG

## 2018-12-06 RX ORDER — PROMETHAZINE HYDROCHLORIDE 25 MG/ML
12.5 INJECTION, SOLUTION INTRAMUSCULAR; INTRAVENOUS EVERY 6 HOURS PRN
Status: DISCONTINUED | OUTPATIENT
Start: 2018-12-06 | End: 2018-12-06 | Stop reason: HOSPADM

## 2018-12-06 RX ORDER — BUPIVACAINE HCL/0.9 % NACL/PF 0.1 %
2 PLASTIC BAG, INJECTION (ML) EPIDURAL ONCE
Status: COMPLETED | OUTPATIENT
Start: 2018-12-06 | End: 2018-12-06

## 2018-12-06 RX ORDER — ROCURONIUM BROMIDE 10 MG/ML
INJECTION, SOLUTION INTRAVENOUS AS NEEDED
Status: DISCONTINUED | OUTPATIENT
Start: 2018-12-06 | End: 2018-12-06 | Stop reason: SURG

## 2018-12-06 RX ORDER — BUPIVACAINE HYDROCHLORIDE AND EPINEPHRINE 2.5; 5 MG/ML; UG/ML
INJECTION, SOLUTION EPIDURAL; INFILTRATION; INTRACAUDAL; PERINEURAL AS NEEDED
Status: DISCONTINUED | OUTPATIENT
Start: 2018-12-06 | End: 2018-12-06 | Stop reason: HOSPADM

## 2018-12-06 RX ORDER — SODIUM CHLORIDE, SODIUM GLUCONATE, SODIUM ACETATE, POTASSIUM CHLORIDE, AND MAGNESIUM CHLORIDE 526; 502; 368; 37; 30 MG/100ML; MG/100ML; MG/100ML; MG/100ML; MG/100ML
1000 INJECTION, SOLUTION INTRAVENOUS CONTINUOUS
Status: DISCONTINUED | OUTPATIENT
Start: 2018-12-06 | End: 2018-12-06 | Stop reason: HOSPADM

## 2018-12-06 RX ORDER — LABETALOL HYDROCHLORIDE 5 MG/ML
5 INJECTION, SOLUTION INTRAVENOUS
Status: DISCONTINUED | OUTPATIENT
Start: 2018-12-06 | End: 2018-12-06 | Stop reason: HOSPADM

## 2018-12-06 RX ORDER — ACETAMINOPHEN 325 MG/1
650 TABLET ORAL ONCE AS NEEDED
Status: DISCONTINUED | OUTPATIENT
Start: 2018-12-06 | End: 2018-12-06 | Stop reason: HOSPADM

## 2018-12-06 RX ORDER — OXYCODONE AND ACETAMINOPHEN 7.5; 325 MG/1; MG/1
1 TABLET ORAL EVERY 6 HOURS PRN
Status: DISCONTINUED | OUTPATIENT
Start: 2018-12-06 | End: 2018-12-06 | Stop reason: HOSPADM

## 2018-12-06 RX ORDER — ROPIVACAINE HYDROCHLORIDE 5 MG/ML
INJECTION, SOLUTION EPIDURAL; INFILTRATION; PERINEURAL
Status: COMPLETED | OUTPATIENT
Start: 2018-12-06 | End: 2018-12-06

## 2018-12-06 RX ORDER — PROPOFOL 10 MG/ML
VIAL (ML) INTRAVENOUS AS NEEDED
Status: DISCONTINUED | OUTPATIENT
Start: 2018-12-06 | End: 2018-12-06 | Stop reason: SURG

## 2018-12-06 RX ORDER — LIDOCAINE HYDROCHLORIDE 20 MG/ML
INJECTION, SOLUTION INFILTRATION; PERINEURAL AS NEEDED
Status: DISCONTINUED | OUTPATIENT
Start: 2018-12-06 | End: 2018-12-06 | Stop reason: SURG

## 2018-12-06 RX ORDER — EPHEDRINE SULFATE 50 MG/ML
5 INJECTION, SOLUTION INTRAVENOUS ONCE AS NEEDED
Status: DISCONTINUED | OUTPATIENT
Start: 2018-12-06 | End: 2018-12-06 | Stop reason: HOSPADM

## 2018-12-06 RX ORDER — ONDANSETRON 2 MG/ML
4 INJECTION INTRAMUSCULAR; INTRAVENOUS ONCE AS NEEDED
Status: DISCONTINUED | OUTPATIENT
Start: 2018-12-06 | End: 2018-12-06 | Stop reason: HOSPADM

## 2018-12-06 RX ORDER — NEOSTIGMINE METHYLSULFATE 4 MG/4 ML
SYRINGE (ML) INTRAVENOUS AS NEEDED
Status: DISCONTINUED | OUTPATIENT
Start: 2018-12-06 | End: 2018-12-06 | Stop reason: SURG

## 2018-12-06 RX ORDER — NALOXONE HCL 0.4 MG/ML
0.2 VIAL (ML) INJECTION AS NEEDED
Status: DISCONTINUED | OUTPATIENT
Start: 2018-12-06 | End: 2018-12-06 | Stop reason: HOSPADM

## 2018-12-06 RX ORDER — FLUMAZENIL 0.1 MG/ML
0.2 INJECTION INTRAVENOUS AS NEEDED
Status: DISCONTINUED | OUTPATIENT
Start: 2018-12-06 | End: 2018-12-06 | Stop reason: HOSPADM

## 2018-12-06 RX ORDER — DEXAMETHASONE SODIUM PHOSPHATE 4 MG/ML
INJECTION, SOLUTION INTRA-ARTICULAR; INTRALESIONAL; INTRAMUSCULAR; INTRAVENOUS; SOFT TISSUE AS NEEDED
Status: DISCONTINUED | OUTPATIENT
Start: 2018-12-06 | End: 2018-12-06 | Stop reason: SURG

## 2018-12-06 RX ORDER — DIPHENHYDRAMINE HYDROCHLORIDE 50 MG/ML
12.5 INJECTION INTRAMUSCULAR; INTRAVENOUS
Status: DISCONTINUED | OUTPATIENT
Start: 2018-12-06 | End: 2018-12-06 | Stop reason: HOSPADM

## 2018-12-06 RX ORDER — MEPERIDINE HYDROCHLORIDE 50 MG/ML
12.5 INJECTION INTRAMUSCULAR; INTRAVENOUS; SUBCUTANEOUS
Status: DISCONTINUED | OUTPATIENT
Start: 2018-12-06 | End: 2018-12-06 | Stop reason: HOSPADM

## 2018-12-06 RX ORDER — OXYCODONE AND ACETAMINOPHEN 7.5; 325 MG/1; MG/1
1 TABLET ORAL EVERY 6 HOURS PRN
Qty: 90 TABLET | Refills: 0 | Status: SHIPPED | OUTPATIENT
Start: 2018-12-06 | End: 2018-12-28 | Stop reason: SDUPTHER

## 2018-12-06 RX ORDER — ACETAMINOPHEN 650 MG/1
650 SUPPOSITORY RECTAL ONCE AS NEEDED
Status: DISCONTINUED | OUTPATIENT
Start: 2018-12-06 | End: 2018-12-06 | Stop reason: HOSPADM

## 2018-12-06 RX ORDER — SODIUM CHLORIDE, SODIUM LACTATE, POTASSIUM CHLORIDE, AND CALCIUM CHLORIDE .6; .31; .03; .02 G/100ML; G/100ML; G/100ML; G/100ML
IRRIGANT IRRIGATION AS NEEDED
Status: DISCONTINUED | OUTPATIENT
Start: 2018-12-06 | End: 2018-12-06 | Stop reason: HOSPADM

## 2018-12-06 RX ADMIN — MIDAZOLAM HYDROCHLORIDE 2 MG: 2 INJECTION, SOLUTION INTRAMUSCULAR; INTRAVENOUS at 07:15

## 2018-12-06 RX ADMIN — ROCURONIUM BROMIDE 45 MG: 10 INJECTION INTRAVENOUS at 07:50

## 2018-12-06 RX ADMIN — LIDOCAINE HYDROCHLORIDE 100 MG: 20 INJECTION, SOLUTION INFILTRATION; PERINEURAL at 07:38

## 2018-12-06 RX ADMIN — PROPOFOL 170 MG: 10 INJECTION, EMULSION INTRAVENOUS at 07:39

## 2018-12-06 RX ADMIN — HYDROMORPHONE HYDROCHLORIDE 0.5 MG: 1 INJECTION, SOLUTION INTRAMUSCULAR; INTRAVENOUS; SUBCUTANEOUS at 10:21

## 2018-12-06 RX ADMIN — GLYCOPYRROLATE 0.4 MG: 0.2 INJECTION, SOLUTION INTRAMUSCULAR; INTRAVENOUS at 09:10

## 2018-12-06 RX ADMIN — Medication 2 G: at 07:31

## 2018-12-06 RX ADMIN — PHENYLEPHRINE HYDROCHLORIDE 100 MCG: 10 INJECTION INTRAVENOUS at 08:10

## 2018-12-06 RX ADMIN — SODIUM CHLORIDE, SODIUM GLUCONATE, SODIUM ACETATE, POTASSIUM CHLORIDE, AND MAGNESIUM CHLORIDE: 526; 502; 368; 37; 30 INJECTION, SOLUTION INTRAVENOUS at 09:07

## 2018-12-06 RX ADMIN — FENTANYL CITRATE 50 MCG: 50 INJECTION, SOLUTION INTRAMUSCULAR; INTRAVENOUS at 07:20

## 2018-12-06 RX ADMIN — SODIUM CHLORIDE, SODIUM GLUCONATE, SODIUM ACETATE, POTASSIUM CHLORIDE, AND MAGNESIUM CHLORIDE 1000 ML: 526; 502; 368; 37; 30 INJECTION, SOLUTION INTRAVENOUS at 06:08

## 2018-12-06 RX ADMIN — PHENYLEPHRINE HYDROCHLORIDE 100 MCG: 10 INJECTION INTRAVENOUS at 08:27

## 2018-12-06 RX ADMIN — PHENYLEPHRINE HYDROCHLORIDE 100 MCG: 10 INJECTION INTRAVENOUS at 08:33

## 2018-12-06 RX ADMIN — Medication 2 MG: at 09:10

## 2018-12-06 RX ADMIN — FENTANYL CITRATE 100 MCG: 50 INJECTION, SOLUTION INTRAMUSCULAR; INTRAVENOUS at 08:05

## 2018-12-06 RX ADMIN — ONDANSETRON 4 MG: 2 INJECTION INTRAMUSCULAR; INTRAVENOUS at 09:08

## 2018-12-06 RX ADMIN — ROCURONIUM BROMIDE 5 MG: 10 INJECTION INTRAVENOUS at 07:35

## 2018-12-06 RX ADMIN — PHENYLEPHRINE HYDROCHLORIDE 100 MCG: 10 INJECTION INTRAVENOUS at 08:22

## 2018-12-06 RX ADMIN — FENTANYL CITRATE 100 MCG: 50 INJECTION, SOLUTION INTRAMUSCULAR; INTRAVENOUS at 07:36

## 2018-12-06 RX ADMIN — DEXAMETHASONE SODIUM PHOSPHATE 4 MG: 4 INJECTION, SOLUTION INTRAMUSCULAR; INTRAVENOUS at 07:54

## 2018-12-06 RX ADMIN — HYDROMORPHONE HYDROCHLORIDE 0.5 MG: 1 INJECTION, SOLUTION INTRAMUSCULAR; INTRAVENOUS; SUBCUTANEOUS at 10:29

## 2018-12-06 RX ADMIN — ROPIVACAINE HYDROCHLORIDE 30 ML: 5 INJECTION, SOLUTION EPIDURAL; INFILTRATION; PERINEURAL at 07:30

## 2018-12-06 NOTE — BRIEF OP NOTE
SHOULDER ARTHROSCOPY WITH ROTATOR CUFF REPAIR  Progress Note    Ricardo Avila  12/6/2018    Pre-op Diagnosis:   Right shoulder pain, unspecified chronicity [M25.511]  Rotator cuff syndrome of right shoulder [M75.101]       Post-Op Diagnosis Codes:     * Right shoulder pain, unspecified chronicity [M25.511]     * Rotator cuff syndrome of right shoulder [M75.101]    Procedure/CPT® Codes:  AR SHLDR ARTHROSCOP,PART SYNOVECT [61202]  AR SHLDR ARTHROSCOP,PART ACROMIOPLAS [23603]    Procedure(s):  Right SHOULDER ARTHROSCOPY WITH SUBACROMIAL DECOMPRESSION,    Surgeon(s):  Isidro Bowens MD    Anesthesia: General    Staff:   Circulator: Sulaiman Munson RN  Scrub Person: Consuelo Carter Samantha K  Endo Technician: Tracy Jay CST  Assistant: Mariya Hernández CSA    Estimated Blood Loss: 30    Urine Voided: * No values recorded between 12/6/2018  7:21 AM and 12/6/2018  9:42 AM *    Specimens:                None      Drains:      Findings: moderate- severe humeral wear with loss of cartilage on the anterior humeral head, pan labral fraying and degenerative changes, intact supraspinatus, rotator cuff tendon    Complications: none      Isidro Bowens MD     Date: 12/6/2018  Time: 10:10 AM

## 2018-12-06 NOTE — DISCHARGE INSTRUCTIONS
Wear sling for right shoulder  Remove dressing in 3 days  May shower, place clean dry dressing over the right shoulder wound.  No heavy lifting with the right shoulder, no lifting over 5 pounds  Return for follow up with orthopaedic surgery in 3 weeks.

## 2018-12-06 NOTE — NURSING NOTE
Pt complaining of feeling like he wasn't taking a deep breath. O2 sat % on room air, pt noted to be pink and no sign of distress noted. Patient and family educated on block. Verbalized understanding

## 2018-12-06 NOTE — ANESTHESIA PREPROCEDURE EVALUATION
Anesthesia Evaluation     Patient summary reviewed and Nursing notes reviewed   no history of anesthetic complications:  NPO Solid Status: > 8 hours  NPO Liquid Status: > 8 hours           Airway   Mallampati: II  TM distance: >3 FB  Neck ROM: full  possible difficult intubation  Comment: Full beard.  Dental    (+) poor dentation    Comment: Grounded surfaces.    Pulmonary - normal exam    breath sounds clear to auscultation  (+) a smoker Former, sleep apnea,     ROS comment: Preoperative study for right shoulder surgery.     COMPARISON: None.     Chest AP and lateral views     Two images were performed. The cardiac silhouette is within  normal limits. The lung fields are clear. No pleural effusion is  identified. No acute bony abnormality is seen.     IMPRESSION:  CONCLUSION: No acute cardiopulmonary pathology is identified.     Electronically signed by:  Balaji Douglass MD  11/30/2018 11:05  Cardiovascular - normal exam    ECG reviewed  Rhythm: regular  Rate: normal    (+) valvular problems/murmurs murmur, murmur (Grade I-II/VI systolic.),     ROS comment: Normal sinus rhythm with sinus arrhythmia  Normal ECG  No previous ECGs available  Confirmed by SOM BACA, B. N. ([id number]) on 11/30/2018 8:00:57 PM    Neuro/Psych  (+) seizures well controlled, psychiatric history (PTSD),     GI/Hepatic/Renal/Endo    (+) obesity,       Musculoskeletal     (+) neck pain (History of cervicalgia. No complaints 12/6/18.),   Abdominal   (+) obese,    Substance History - negative use     OB/GYN negative ob/gyn ROS         Other - negative ROS                       Anesthesia Plan    ASA 3     general   (Discussed peripheral nerve block(interscalene) for post op pain relief and patient understands possible complications,risks and agrees.)  intravenous induction   Anesthetic plan, all risks, benefits, and alternatives have been provided, discussed and informed consent has been obtained with: patient and spouse/significant  other.

## 2018-12-06 NOTE — H&P
Patient Care Team:  Simi Singh MD as PCP - General (Family Medicine)    Chief complaint   No chief complaint on file.      Subjective     History of Present Illness   33 y right shoulder pain, [facility]  Persistent pain of shoulder, which has not improved, he has catching and locking of the shoulder.    Review of Systems   All systems negative including heme, gi/gu, card/pulm, derm general, musc, neur, psych, head eyes ears nose throat    Past Medical History:   Diagnosis Date   • Bronchitis, chronic (CMS/HCC)    • Celiac disease    • Heart murmur    • Heart murmur     noted in the army   • History of ear infections    • History of strep sore throat    • PTSD (post-traumatic stress disorder)    • Seizures (CMS/HCC)     Last seizure 10-08-17   • Shoulder pain, right    • Sinusitis    • Sleep apnea     uses cpap     Past Surgical History:   Procedure Laterality Date   • SHOULDER SURGERY Right    • SHOULDER SURGERY Right 2017    Arthroscopy for bicep tendenosis/debridement and trimmed the labrum     Family History   Problem Relation Age of Onset   • Hypertension Mother    • Asthma Mother    • Osteoporosis Mother    • Arthritis Mother    • Hypertension Father    • Arthritis Father      Social History     Tobacco Use   • Smoking status: Former Smoker     Last attempt to quit: 2016     Years since quittin.6   • Smokeless tobacco: Never Used   Substance Use Topics   • Alcohol use: Yes     Comment: beer per day   • Drug use: No     Medications Prior to Admission   Medication Sig Dispense Refill Last Dose   • doxepin (SINEquan) 75 MG capsule Take 75 mg by mouth Every Night.   2018 at 2100   • Magnesium 400 MG capsule Take 2 tablets by mouth 2 (Two) Times a Day.   2018 at 2100   • nabumetone (RELAFEN) 750 MG tablet Take 1 tablet by mouth 2 (Two) Times a Day. 60 tablet 2 Past Week at Unknown time   • prazosin (MINIPRESS) 1 MG capsule Take 1 mg by mouth Every Night.    12/5/2018 at 2100   • SUMAtriptan (IMITREX) 100 MG tablet Take one tablet at onset of headache. May repeat dose one time in 2 hours if headache not relieved. Take one tablet at onset of headache. May repeat dose one time in 2 hours if headache not relieved.   Past Month at Unknown time   • vitamin B-12 (CYANOCOBALAMIN) 500 MCG tablet Take 500 mcg by mouth Daily.   12/5/2018 at 0700   • naproxen (NAPROSYN) 500 MG tablet Take 500 mg by mouth Daily As Needed.   More than a month at Unknown time   • promethazine (PHENERGAN) 25 MG tablet Take 25 mg by mouth Every 6 (Six) Hours As Needed for Nausea or Vomiting.   More than a month at Unknown time     Allergies:  Other    Objective      Vital Signs  Temp:  [97.2 °F (36.2 °C)] 97.2 °F (36.2 °C)  Heart Rate:  [75] 75  Resp:  [18] 18  BP: (116)/(63) 116/63    Physical Exam     alert, oriented  Speech clear, no signs jugular venous distention  Regular breathing, no cough no wheezing  Abdomen soft  No masses  Pulse present in the extremities  Motor exam is normal      Assessment/Plan       Rotator cuff syndrome of right shoulder    Right shoulder pain      Assessment & Plan  Patient will proceed for shoulder surgery, see office note.  I discussed the patients findings and my recommendations with patient    Isidro Bowens MD  12/06/18  6:51 AM    Time: More than 50% of time spent in counseling and coordination of care:  Total face-to-face/floor time 35 min.  Time spent in counseling 35 min. Counseling included the following topics: surgery

## 2018-12-06 NOTE — ANESTHESIA POSTPROCEDURE EVALUATION
Patient: Ricardo Avila    Procedure Summary     Date:  12/06/18 Room / Location:  [facility] OR [address] [place] [address] OR    Anesthesia Start:  0714 Anesthesia Stop:      Procedure:  SHOULDER ARTHROSCOPY WITH SUBACROMIAL DECOMPRESSION, (Right Shoulder) Diagnosis:       Right shoulder pain, unspecified chronicity      Rotator cuff syndrome of right shoulder      (Right shoulder pain, unspecified chronicity [M25.511])      (Rotator cuff syndrome of right shoulder [M75.101])    Surgeon:  Isidro Bowens MD Provider:  Kodak Pope MD    Anesthesia Type:  general ASA Status:  3          Anesthesia Type: general  Last vitals  BP   116/63 (12/06/18 0558)   Temp   97.2 °F (36.2 °C) (12/06/18 0558)   Pulse   75 (12/06/18 0558)   Resp   18 (12/06/18 0558)     SpO2   97 % (12/06/18 0558)     Post Anesthesia Care and Evaluation    Patient location during evaluation: PACU  Patient participation: complete - patient participated  Level of consciousness: awake  Pain score: 0  Pain management: adequate  Airway patency: patent  Anesthetic complications: No anesthetic complications  PONV Status: controlled  Cardiovascular status: acceptable  Respiratory status: acceptable  Hydration status: acceptable

## 2018-12-06 NOTE — ANESTHESIA PROCEDURE NOTES
ANESTHESIA INTUBATION  Urgency: elective    Airway not difficult    General Information and Staff    Patient location during procedure: OR  Anesthesiologist: Kodak Pope MD  CRNA: Sulaiman Houser CRNA    Indications and Patient Condition  Indications for airway management: airway protection    Preoxygenated: yes  MILS maintained throughout  Mask difficulty assessment: 0 - not attempted    Final Airway Details  Final airway type: endotracheal airway      Successful airway: ETT  Cuffed: yes   Successful intubation technique: direct laryngoscopy  Facilitating devices/methods: intubating stylet  Endotracheal tube insertion site: oral  Blade: Susy  Blade size: 4

## 2018-12-06 NOTE — OP NOTE
Diagnosis right shoulder labral tear, subacromial impingement  Diagnosis right shoulder labral attrition- tear, subacromial impingement, glenohumeral osteoarthritis  Surgery right shoulder arthroscopy with chondroplasty and synovectomy, subacromial decompression and bursectomy  General anesthesia with scalene block  Surgeon Joey Benavidez SMcGregor  Blood loss 30  Findings: moderate- severe humeral wear with loss of cartilage on the anterior humeral head, pan labral fraying and degenerative changes, intact supraspinatus, rotator cuff tendon  The patient is brought to the operating room, he is anesthetized.  An airway is placed.  A scalene block is placed by the anesthesia team.  The patient is positioned in the upright position.  The right arm is elevated, washed with chloroprep based wash, sterile surgical barriers are placed, sterile surgical technique is performed throughout the course of the operation.  The consent and IV antibiotics are reviewed.  The MRI is reviewed prior to the operation.  A standard posterior portal is placed, 11 blade, incising the skin, the cannula is placed within the joint, the arthroscope is placed.  Fluid is inflowed for distention of the joint.  Position of the arthroscope through the posterior portal, I could see pan labral attritional degeneration, no focal tear.  No biceps insertion due to tenotomy and tenodesis performed previously.  Subscapularis is intact.  There is a rent in the anterior joint capsule.  There is glenoid wear centrally, mild.  No overt labral detachment is present.   I established anterior portal with spinal needle.  I placed anterior incision, placed a canula.  The arthroscopic shaver is placed.  I debrided the labral fraying anteriorly, posteriorly and superiorly, no biceps anchor present from previous tenotomy.  I switched portals bringing the arthroscope from the anterior portal.  The humeral head has cartilaginous wear on the anterior aspect, which is  near full thickness.  The probe is positioned from the posterior portal, again no labral lift off, but attritional wear is evident. The rotator cuff is intact.  I debrided synovial fraying.  I repositioned the arthroscope from the posterior portal, now into the subacromial space.  I positioned the arthroscope from the anterior portal.  I removed the bursa, and outer cortical margin of the undersurface of the acromion.  Exposed the subcortical cancellous bone.  I inspected the bursal side rotator cuff tendon, the tendon is in good condition, no evidence of tear.  I continued to remove the bone undersurface of the acromion, working medially to the acromioclavicular joint, I debrided the undersurface of the acromioclavicular joint.  There was good exposure of cancellous bone surface of the acromion.  I removed the shaver and arthroscopic cannula.  Remaining fluid is removed from the joint space.  I injected 8cc of 0.25% bupivacaine and 1mg morphine.  The arthroscopic portals are sutured closed with nylon.  Sterile dressings are placed.  The right arm is placed in a sling.  The patient is extubated.  No complications.

## 2018-12-06 NOTE — ANESTHESIA PROCEDURE NOTES
Peripheral Block    Pre-sedation assessment completed: 12/6/2018 7:15 AM    Patient reassessed immediately prior to procedure    Patient location during procedure: OR  Start time: 12/6/2018 7:15 AM  Stop time: 12/6/2018 7:35 AM  Performed by  Anesthesiologist: Kodak Pope MD  Preanesthetic Checklist  Completed: patient identified, site marked, surgical consent, pre-op evaluation, timeout performed, IV checked, risks and benefits discussed and monitors and equipment checked  Prep:  Pt Position: supine  Sterile barriers:cap, gloves, sterile barriers and mask  Prep: ChloraPrep  Patient monitoring: blood pressure monitoring, continuous pulse oximetry and EKG  Procedure  Sedation:yes  Performed under: PNB  Guidance:ultrasound guided  ULTRASOUND INTERPRETATION.  Using ultrasound guidance a 20 G gauge needle was placed in close proximity to the brachial plexus nerve, at which point, under ultrasound guidance anesthetic was injected in the area of the nerve and spread of the anesthesia was seen on ultrasound in close proximity thereto.  There were no abnormalities seen on ultrasound; a digital image was taken; and the patient tolerated the procedure with no complications. Images:still images obtained    Laterality:right  Block Type:interscalene  Injection Technique:single-shot  Needle Type:echogenic  Needle Gauge:20 G    Medications Used: ropivacaine (NAROPIN) 0.5 % injection, 30 mL  Med admintered at 12/6/2018 7:30 AM  Post Assessment  Injection Assessment: negative aspiration for heme, no paresthesia on injection and incremental injection  Patient Tolerance:comfortable throughout block  Complications:no  Additional Notes  Ultrasound guided. Picture obtained.

## 2018-12-28 ENCOUNTER — OFFICE VISIT (OUTPATIENT)
Dept: ORTHOPEDIC SURGERY | Facility: CLINIC | Age: 33
End: 2018-12-28

## 2018-12-28 VITALS — BODY MASS INDEX: 35.79 KG/M2 | HEIGHT: 67 IN | WEIGHT: 228 LBS

## 2018-12-28 DIAGNOSIS — M94.8X1 OTHER SPECIFIED DISORDERS OF CARTILAGE, SHOULDER: ICD-10-CM

## 2018-12-28 DIAGNOSIS — M75.101 ROTATOR CUFF SYNDROME OF RIGHT SHOULDER: ICD-10-CM

## 2018-12-28 DIAGNOSIS — S43.431D SUPERIOR GLENOID LABRUM LESION OF RIGHT SHOULDER, SUBSEQUENT ENCOUNTER: Primary | ICD-10-CM

## 2018-12-28 PROCEDURE — 99024 POSTOP FOLLOW-UP VISIT: CPT | Performed by: ORTHOPAEDIC SURGERY

## 2018-12-28 RX ORDER — OXYCODONE AND ACETAMINOPHEN 7.5; 325 MG/1; MG/1
1 TABLET ORAL EVERY 6 HOURS PRN
Qty: 60 TABLET | Refills: 0 | Status: SHIPPED | OUTPATIENT
Start: 2018-12-28 | End: 2019-02-20

## 2018-12-28 NOTE — PROGRESS NOTES
Postop Follow-up    Name:  Ricardo Avila  Date:  2018  :  1985    Chief Complaint:    Chief Complaint   Patient presents with   • Right Shoulder - Follow-up   • Wound Check   • Suture / Staple Removal     Date of surgery:         18 (22d) Isidro Bowens MD    SHOULDER ARTHROSCOPY WITH SUBACROMIAL DECOMPRESSION, - Right       Procedure:    History of Present Illness: recheck right shoulder with suture removal   Pain scale today 6/10    Returns today for exam, states shoulder is doing ok, no particular complaints or problems.  Requesting refill of pain medicines.       Current Outpatient Medications:   •  doxepin (SINEquan) 75 MG capsule, Take 75 mg by mouth Every Night., Disp: , Rfl:   •  Magnesium 400 MG capsule, Take 2 tablets by mouth 2 (Two) Times a Day., Disp: , Rfl:   •  nabumetone (RELAFEN) 750 MG tablet, Take 1 tablet by mouth 2 (Two) Times a Day., Disp: 60 tablet, Rfl: 2  •  naproxen (NAPROSYN) 500 MG tablet, Take 500 mg by mouth Daily As Needed., Disp: , Rfl:   •  oxyCODONE-acetaminophen (PERCOCET) 7.5-325 MG per tablet, Take 1 tablet by mouth Every 6 (Six) Hours As Needed for Moderate Pain., Disp: 60 tablet, Rfl: 0  •  prazosin (MINIPRESS) 1 MG capsule, Take 1 mg by mouth Every Night., Disp: , Rfl:   •  promethazine (PHENERGAN) 25 MG tablet, Take 25 mg by mouth Every 6 (Six) Hours As Needed for Nausea or Vomiting., Disp: , Rfl:   •  SUMAtriptan (IMITREX) 100 MG tablet, Take one tablet at onset of headache. May repeat dose one time in 2 hours if headache not relieved. Take one tablet at onset of headache. May repeat dose one time in 2 hours if headache not relieved., Disp: , Rfl:   •  vitamin B-12 (CYANOCOBALAMIN) 500 MCG tablet, Take 500 mcg by mouth Daily., Disp: , Rfl:     Allergies   Allergen Reactions   • Other Other (See Comments)     Gluten and wheat based foods pt has celiac disease         Exam:  Vitals:    18 1111   Weight: 103 kg (228 lb)   Height: 170.2  "cm (67\")       The patient is awake, alert, and oriented and in no apparent distress.    Right upper extremity:  Incisions healing well, no erythema, no drainage  No masses.      Left upper extremity:    Right lower extremity:    Left lower extremity:      Xr Chest Pa & Lateral    Result Date: 11/30/2018  Narrative: Preoperative study for right shoulder surgery. COMPARISON: None. Chest AP and lateral views Two images were performed. The cardiac silhouette is within normal limits. The lung fields are clear. No pleural effusion is identified. No acute bony abnormality is seen.     Impression: CONCLUSION: No acute cardiopulmonary pathology is identified. Electronically signed by:  Balaji Douglass MD  11/30/2018 11:05 AM CST Workstation: [employer]        Assessment:  Diagnoses and all orders for this visit:    Superior glenoid labrum lesion of right shoulder, subsequent encounter  -     Ambulatory Referral to Physical Therapy Evaluate and treat, Ortho; Electrotherapy; Iontophoresis, E-stim; Stretching, ROM, Strengthening; 3; 3; 3; Right    Rotator cuff syndrome of right shoulder  -     Ambulatory Referral to Physical Therapy Evaluate and treat, Ortho; Electrotherapy; Iontophoresis, E-stim; Stretching, ROM, Strengthening; 3; 3; 3; Right    Other specified disorders of cartilage, shoulder    Other orders  -     oxyCODONE-acetaminophen (PERCOCET) 7.5-325 MG per tablet; Take 1 tablet by mouth Every 6 (Six) Hours As Needed for Moderate Pain.          Plan:    Begin physical therapy  Narcotic medicines refills, I cuationed him that narcotic medicines can be addictive, abuse can lead to coma or death.      No Follow-up on file.      12/28/18 at 11:11 AM by Isidro Bowens MD  "

## 2019-01-09 ENCOUNTER — APPOINTMENT (OUTPATIENT)
Dept: PHYSICAL THERAPY | Facility: HOSPITAL | Age: 34
End: 2019-01-09
Attending: ORTHOPAEDIC SURGERY

## 2019-01-31 ENCOUNTER — HOSPITAL ENCOUNTER (OUTPATIENT)
Dept: PHYSICAL THERAPY | Facility: HOSPITAL | Age: 34
Setting detail: THERAPIES SERIES
Discharge: HOME | End: 2019-01-31
Attending: ORTHOPAEDIC SURGERY

## 2019-01-31 DIAGNOSIS — S43.431D SUPERIOR GLENOID LABRUM LESION OF RIGHT SHOULDER, SUBSEQUENT ENCOUNTER: Primary | ICD-10-CM

## 2019-01-31 DIAGNOSIS — M75.101 ROTATOR CUFF SYNDROME OF RIGHT SHOULDER: ICD-10-CM

## 2019-01-31 PROCEDURE — 97162 PT EVAL MOD COMPLEX 30 MIN: CPT | Performed by: PHYSICAL THERAPIST

## 2019-01-31 PROCEDURE — 97110 THERAPEUTIC EXERCISES: CPT | Performed by: PHYSICAL THERAPIST

## 2019-02-06 ENCOUNTER — HOSPITAL ENCOUNTER (OUTPATIENT)
Dept: PHYSICAL THERAPY | Facility: HOSPITAL | Age: 34
Setting detail: THERAPIES SERIES
Discharge: HOME | End: 2019-02-06
Attending: ORTHOPAEDIC SURGERY

## 2019-02-06 DIAGNOSIS — S16.1XXA NECK STRAIN, INITIAL ENCOUNTER: ICD-10-CM

## 2019-02-06 DIAGNOSIS — M75.101 ROTATOR CUFF SYNDROME OF RIGHT SHOULDER: ICD-10-CM

## 2019-02-06 DIAGNOSIS — M54.2 CERVICALGIA: ICD-10-CM

## 2019-02-06 DIAGNOSIS — Z98.890 STATUS POST ARTHROSCOPY OF SHOULDER: ICD-10-CM

## 2019-02-06 DIAGNOSIS — S43.431D SUPERIOR GLENOID LABRUM LESION OF RIGHT SHOULDER, SUBSEQUENT ENCOUNTER: Primary | ICD-10-CM

## 2019-02-06 DIAGNOSIS — M25.511 ACUTE PAIN OF RIGHT SHOULDER: ICD-10-CM

## 2019-02-06 PROCEDURE — 97110 THERAPEUTIC EXERCISES: CPT
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
## 2019-02-20 ENCOUNTER — OFFICE VISIT (OUTPATIENT)
Dept: ORTHOPEDIC SURGERY | Facility: CLINIC | Age: 34
End: 2019-02-20

## 2019-02-20 VITALS — WEIGHT: 239 LBS | BODY MASS INDEX: 37.51 KG/M2 | HEIGHT: 67 IN

## 2019-02-20 DIAGNOSIS — M75.101 ROTATOR CUFF SYNDROME OF RIGHT SHOULDER: ICD-10-CM

## 2019-02-20 DIAGNOSIS — M94.8X1 OTHER SPECIFIED DISORDERS OF CARTILAGE, SHOULDER: ICD-10-CM

## 2019-02-20 DIAGNOSIS — M25.511 RIGHT SHOULDER PAIN, UNSPECIFIED CHRONICITY: ICD-10-CM

## 2019-02-20 DIAGNOSIS — S43.431D SUPERIOR GLENOID LABRUM LESION OF RIGHT SHOULDER, SUBSEQUENT ENCOUNTER: Primary | ICD-10-CM

## 2019-02-20 PROCEDURE — 99024 POSTOP FOLLOW-UP VISIT: CPT | Performed by: ORTHOPAEDIC SURGERY

## 2019-02-20 NOTE — PROGRESS NOTES
"Ricardo Avila is a 33 y.o. male returns for     Chief Complaint   Patient presents with   • Right Shoulder - Follow-up       HISTORY OF PRESENT ILLNESS: follow up on right shoulder pain. Patient had shoulder surgery on 12/06/2018  NV SHLDR ARTHROSCOP,PART SYNOVECT   NV SHLDR ARTHROSCOP,PART ACROMIOPLAS   A majority of the pain is gone since surgery. Patient has finished PT.     States he is doing well. Pain is improved.  He is using the shoulder, has some clicking/ catching at times.    States he is taking pain medicines/ narcotics infrequently.    No fevers, no chills  No nighttime awakening pain.         CONCURRENT MEDICAL HISTORY:    The following portions of the patient's history were reviewed and updated as appropriate: allergies, current medications, past family history, past medical history, past social history, past surgical history and problem list.     ROS  No fevers or chills.  No chest pain or shortness of air.  No GI or  disturbances.    PHYSICAL EXAMINATION:       Ht 170.2 cm (67\")   Wt 108 kg (239 lb)   BMI 37.43 kg/m²     Physical Exam    GAIT:     [x]  Normal  []  Antalgic    Assistive device: [x]  None  []  Walker     []  Crutches  []  Cane     []  Wheelchair  []  Stretcher    Ortho Exam    Right shoulder elevation 170  No instability detected              ASSESSMENT:    Diagnoses and all orders for this visit:    Superior glenoid labrum lesion of right shoulder, subsequent encounter    Rotator cuff syndrome of right shoulder    Other specified disorders of cartilage, shoulder    Right shoulder pain, unspecified chronicity          PLAN    CONTINUE use progression of right shoulder, I reviewed with him discretionary use, not to exceed moderate weight limits of use.  He could be cleared for moderate use/ activity at this point.    Reassess in May      Isidro Bowens MD  "

## 2019-05-15 ENCOUNTER — OFFICE VISIT (OUTPATIENT)
Dept: ORTHOPEDIC SURGERY | Facility: CLINIC | Age: 34
End: 2019-05-15

## 2019-05-15 VITALS — BODY MASS INDEX: 37.35 KG/M2 | HEIGHT: 67 IN | WEIGHT: 238 LBS

## 2019-05-15 DIAGNOSIS — M75.101 ROTATOR CUFF SYNDROME OF RIGHT SHOULDER: Primary | ICD-10-CM

## 2019-05-15 DIAGNOSIS — M25.511 CHRONIC RIGHT SHOULDER PAIN: ICD-10-CM

## 2019-05-15 DIAGNOSIS — M94.8X1 OTHER SPECIFIED DISORDERS OF CARTILAGE, SHOULDER: ICD-10-CM

## 2019-05-15 DIAGNOSIS — G89.29 CHRONIC RIGHT SHOULDER PAIN: ICD-10-CM

## 2019-05-15 PROCEDURE — 99213 OFFICE O/P EST LOW 20 MIN: CPT | Performed by: ORTHOPAEDIC SURGERY

## 2019-05-15 RX ORDER — HYDROCODONE BITARTRATE AND ACETAMINOPHEN 5; 325 MG/1; MG/1
1 TABLET ORAL EVERY 6 HOURS PRN
Qty: 60 TABLET | Refills: 0 | Status: SHIPPED | OUTPATIENT
Start: 2019-05-15 | End: 2022-03-07

## 2019-05-15 NOTE — PROGRESS NOTES
Ricardo Avila is a 33 y.o. male returns for     Chief Complaint   Patient presents with   • Right Shoulder - Follow-up, Pain       HISTORY OF PRESENT ILLNESS:S/p        12/06/18 (5m) Isidro Bowens MD    SHOULDER ARTHROSCOPY WITH SUBACROMIAL DECOMPRESSION, - Right     Continued pain in right shoulder.  Patient states he has finished Physical therapy.  Pain scale today 9/10    33 y pain of the right shoulder, the pain is present with general use, pain is on the top and back of the shoulder.  Unclear if 3 surgeries now have improved him much.  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  Pain primarily occurs with use and movement.       CONCURRENT MEDICAL HISTORY:    The following portions of the patient's history were reviewed and updated as appropriate: allergies, current medications, past family history, past medical history, past social history, past surgical history and problem list.     ROS  No fevers or chills.  No chest pain or shortness of air.  No GI or  disturbances.    Current Outpatient Medications on File Prior to Visit   Medication Sig Dispense Refill   • doxepin (SINEquan) 75 MG capsule Take 75 mg by mouth Every Night.     • Magnesium 400 MG capsule Take 2 tablets by mouth 2 (Two) Times a Day.     • nabumetone (RELAFEN) 750 MG tablet Take 1 tablet by mouth 2 (Two) Times a Day. 60 tablet 2   • naproxen (NAPROSYN) 500 MG tablet Take 500 mg by mouth Daily As Needed.     • prazosin (MINIPRESS) 1 MG capsule Take 1 mg by mouth Every Night.     • promethazine (PHENERGAN) 25 MG tablet Take 25 mg by mouth Every 6 (Six) Hours As Needed for Nausea or Vomiting.     • SUMAtriptan (IMITREX) 100 MG tablet Take one tablet at onset of headache. May repeat dose one time in 2 hours if headache not relieved. Take one tablet at onset of headache. May repeat dose one time in 2 hours if  "headache not relieved.     • vitamin B-12 (CYANOCOBALAMIN) 500 MCG tablet Take 500 mcg by mouth Daily.       No current facility-administered medications on file prior to visit.        Past Medical History:   Diagnosis Date   • Bronchitis, chronic (CMS/HCC)    • Celiac disease    • Heart murmur    • Heart murmur     noted in the army   • History of ear infections    • History of strep sore throat    • PTSD (post-traumatic stress disorder)    • Seizures (CMS/HCC)     Last seizure 10-08-17   • Shoulder pain, right    • Sinusitis    • Sleep apnea     uses cpap     Past Surgical History:   Procedure Laterality Date   • SHOULDER ARTHROSCOPY W/ ROTATOR CUFF REPAIR Right 12/6/2018    Procedure: SHOULDER ARTHROSCOPY WITH SUBACROMIAL DECOMPRESSION,;  Surgeon: Isidro Bowens MD;  Location: [facility];  Service: Orthopedics   • SHOULDER SURGERY Right 2016   • SHOULDER SURGERY Right 09/08/2017    Arthroscopy for bicep tendenosis/debridement and trimmed the labrum       PHYSICAL EXAMINATION:       Ht 170.2 cm (67\")   Wt 108 kg (238 lb)   BMI 37.28 kg/m²     Physical Exam   Constitutional: He appears well-developed.   HENT:   Head: Normocephalic and atraumatic.   Eyes: Pupils are equal, round, and reactive to light. Right eye exhibits no discharge. Left eye exhibits no discharge.   Neck: Normal range of motion. No JVD present. No tracheal deviation present. No thyromegaly present.   Cardiovascular: Normal rate and intact distal pulses. Exam reveals no gallop.   Pulmonary/Chest: Effort normal and breath sounds normal. No respiratory distress. He has no wheezes. He has no rales. He exhibits no tenderness.   Abdominal: Soft. Bowel sounds are normal. He exhibits no distension. There is no tenderness. There is no guarding.   Musculoskeletal: He exhibits no edema, tenderness or deformity.   Lymphadenopathy:     He has no cervical adenopathy.   Neurological: He is alert. He has normal reflexes. He displays normal reflexes. " No cranial nerve deficit. Coordination normal.   Skin: Skin is warm. Capillary refill takes less than 2 seconds. No rash noted. No erythema.   Psychiatric: He has a normal mood and affect. His behavior is normal. Thought content normal.       GAIT:     [x]  Normal  []  Antalgic    Assistive device: [x]  None  []  Walker     []  Crutches  []  Cane     []  Wheelchair  []  Stretcher    Right Shoulder Exam     Tenderness   The patient is experiencing tenderness in the acromioclavicular joint and acromion (pain exaggerated behaviour).    Range of Motion   Active abduction: 160   Passive abduction: 170     Muscle Strength   Abduction: 4/5   Internal rotation: 4/5     Tests   Apprehension: negative  Turner test: positive  Impingement: positive    Other   Erythema: absent  Sensation: normal  Pulse: present              No results found.          ASSESSMENT:    Diagnoses and all orders for this visit:    Rotator cuff syndrome of right shoulder    Other specified disorders of cartilage, shoulder    Chronic right shoulder pain          PLAN      Discussed total shoulder may be necessary eventually, I reviewed with him limitations with regard to use of the shoulder, persistent pain and functional use of the shoulder.  I have written pain medicines for him today, discussed with him narcotic medicines can be addictive, abuse can lead to death or coma, do not drive or ingest alcohol while taking.        Isidro Bowens MD

## 2022-01-12 ENCOUNTER — TRANSCRIBE ORDERS (OUTPATIENT)
Dept: ORTHOPEDIC SURGERY | Facility: CLINIC | Age: 37
End: 2022-01-12

## 2022-01-12 DIAGNOSIS — M25.511 RIGHT SHOULDER PAIN, UNSPECIFIED CHRONICITY: Primary | ICD-10-CM

## 2022-03-03 DIAGNOSIS — G89.29 CHRONIC RIGHT SHOULDER PAIN: Primary | ICD-10-CM

## 2022-03-03 DIAGNOSIS — M25.511 CHRONIC RIGHT SHOULDER PAIN: Primary | ICD-10-CM

## 2022-03-07 ENCOUNTER — OFFICE VISIT (OUTPATIENT)
Dept: ORTHOPEDIC SURGERY | Facility: CLINIC | Age: 37
End: 2022-03-07

## 2022-03-07 VITALS — BODY MASS INDEX: 37.35 KG/M2 | WEIGHT: 238 LBS | HEIGHT: 67 IN

## 2022-03-07 DIAGNOSIS — G89.29 CHRONIC RIGHT SHOULDER PAIN: Primary | ICD-10-CM

## 2022-03-07 DIAGNOSIS — Z87.39: ICD-10-CM

## 2022-03-07 DIAGNOSIS — M25.511 CHRONIC RIGHT SHOULDER PAIN: Primary | ICD-10-CM

## 2022-03-07 DIAGNOSIS — M19.019 ARTHRITIS OF SHOULDER: ICD-10-CM

## 2022-03-07 DIAGNOSIS — M24.811 INTERNAL DERANGEMENT OF RIGHT SHOULDER: ICD-10-CM

## 2022-03-07 PROCEDURE — 99214 OFFICE O/P EST MOD 30 MIN: CPT | Performed by: NURSE PRACTITIONER

## 2022-03-07 RX ORDER — MELOXICAM 15 MG/1
15 TABLET ORAL DAILY
COMMUNITY

## 2022-03-07 NOTE — PROGRESS NOTES
Ricardo Avila is a 36 y.o. male   Primary provider:  Simi Singh MD       Chief Complaint   Patient presents with   • Right Shoulder - Pain   • Establish Care       HISTORY OF PRESENT ILLNESS:      Patient is a 36-year-old male who presents today with complaints of right shoulder pain.  Patient has suffered from chronic shoulder pain for around the past decade.  Patient was originally injured while deployed in [place], he reports damage to frontal humeral head.  He has been evaluated for shoulder pain by Dr. Krishnamurthy in the past.  Patient reports being treated with subacromial injections, meloxicam, activity modification, rice therapy.  He is also underwent 2 shoulder surgeries.  His most recent was a subacromial decompression in 2018.  He reports some brief pain relief, however symptoms returned.  He is interested in shoulder replacement surgery.  He reports his pain is severe and constant.  Pain is a crushing, grinding, stabbing, aching pain.  Pain is associated with popping and intermittent swelling.  Essentially all activity aggravates pain, however reaching behind him and overhead aggravates symptoms the most.  He is also having complaints that sound suggestive of subluxation.  Patient reports shoulder feels as if it pops out of place and then he must manually reduce shoulder.  His most recent MRI was in 2018.      Pain  This is a chronic problem. The current episode started more than 1 year ago (2010/2011). The problem occurs constantly. Associated symptoms include joint swelling and numbness. Associated symptoms comments: Crushing, stabbing, aching, burning, redness, clicking, bruising, swelling. The symptoms are aggravated by walking (sitting, driving).        CONCURRENT MEDICAL HISTORY:    Past Medical History:   Diagnosis Date   • Bronchitis, chronic (HCC)    • Celiac disease    • Depression    • Heart murmur    • Heart murmur     noted in the army   • History of ear infections    • History of  strep sore throat    • PTSD (post-traumatic stress disorder)    • Seizures (HCC)     Last seizure 10-08-17   • Shoulder pain, right    • Sinusitis    • Sleep apnea     uses cpap   • Sleep apnea        Allergies   Allergen Reactions   • Other Other (See Comments)     Gluten and wheat based foods pt has celiac disease         Current Outpatient Medications:   •  doxepin (SINEquan) 75 MG capsule, Take 75 mg by mouth Every Night., Disp: , Rfl:   •  Magnesium 400 MG capsule, Take 2 tablets by mouth 2 (Two) Times a Day., Disp: , Rfl:   •  meloxicam (MOBIC) 15 MG tablet, Take 15 mg by mouth Daily., Disp: , Rfl:   •  naproxen (NAPROSYN) 500 MG tablet, Take 500 mg by mouth Daily As Needed., Disp: , Rfl:   •  prazosin (MINIPRESS) 1 MG capsule, Take 1 mg by mouth Every Night., Disp: , Rfl:   •  promethazine (PHENERGAN) 25 MG tablet, Take 25 mg by mouth Every 6 (Six) Hours As Needed for Nausea or Vomiting., Disp: , Rfl:   •  SUMAtriptan (IMITREX) 100 MG tablet, Take 100 mg by mouth Every 2 (Two) Hours As Needed for Migraine. Take one tablet at onset of headache. May repeat dose one time in 2 hours if headache not relieved., Disp: , Rfl:     Past Surgical History:   Procedure Laterality Date   • SHOULDER ARTHROSCOPY W/ ROTATOR CUFF REPAIR Right 2018    Procedure: SHOULDER ARTHROSCOPY WITH SUBACROMIAL DECOMPRESSION,;  Surgeon: Isidro Bowens MD;  Location: [facility];  Service: Orthopedics   • SHOULDER SURGERY Right 2016   • SHOULDER SURGERY Right 2017    Arthroscopy for bicep tendenosis/debridement and trimmed the labrum       Family History   Problem Relation Age of Onset   • Hypertension Mother    • Asthma Mother    • Osteoporosis Mother    • Arthritis Mother    • Hypertension Father    • Arthritis Father         Social History     Socioeconomic History   • Marital status:    Tobacco Use   • Smoking status: Former Smoker     Quit date: 2016     Years since quittin.9   • Smokeless tobacco:  "Never Used   Substance and Sexual Activity   • Alcohol use: Yes     Comment: one drink every couple of months   • Drug use: No   • Sexual activity: Defer        Review of Systems   Musculoskeletal: Positive for joint swelling.        Stiffness   Neurological: Positive for numbness.   All other systems reviewed and are negative.      PHYSICAL EXAMINATION:       Ht 170.2 cm (67\")   Wt 108 kg (238 lb)   BMI 37.28 kg/m²     Physical Exam  Vitals and nursing note reviewed.   Constitutional:       General: He is not in acute distress.     Appearance: He is well-developed. He is not toxic-appearing.   HENT:      Head: Normocephalic.   Pulmonary:      Effort: Pulmonary effort is normal. No respiratory distress.   Skin:     General: Skin is warm and dry.   Neurological:      Mental Status: He is alert and oriented to person, place, and time.   Psychiatric:         Behavior: Behavior normal.         Thought Content: Thought content normal.         Judgment: Judgment normal.         GAIT:     [x]  Normal  []  Antalgic    Assistive device: [x]  None  []  Walker     []  Crutches  []  Cane     []  Wheelchair  []  Stretcher    Right Shoulder Exam     Tenderness   The patient is experiencing tenderness in the acromion and acromioclavicular joint.    Range of Motion   Active abduction: 140 (hurts at 90)   Extension: 40   External rotation: 90   Forward flexion: 170 (pain at 90 degrees)   Internal rotation 90 degrees: 40     Other   Erythema: absent  Scars: present  Sensation: normal  Pulse: present    Comments:  Positive full can test.  Positive empty can test.  Positive liftoff test  Mild discomfort with impingement test and crossarm test              No results found.        ASSESSMENT:    Diagnoses and all orders for this visit:    Chronic right shoulder pain  -     MRI Shoulder Right Without Contrast; Future    Internal derangement of right shoulder  -     MRI Shoulder Right Without Contrast; Future    Arthritis of shoulder  - "     MRI Shoulder Right Without Contrast; Future    Personal history of subluxation of joint  -     MRI Shoulder Right Without Contrast; Future    Other orders  -     meloxicam (MOBIC) 15 MG tablet; Take 15 mg by mouth Daily.          PLAN      Patient has tried and failed both surgical and conservative management of shoulder pain.  X-rays reviewed, degenerative changes seen.  Patient is interested in shoulder replacement because he is looking for some lasting relief to his pain.  Exam today suggest internal derangement.  Patient also has crepitus with arc of motion indicative of arthritis as well as complaints of subluxation.  MRI did show abnormality of labrum.  New MRI ordered.  Recommend patient return to see surgeon for evaluation and to discuss possible surgery.  Patient to continue meloxicam, RICE therapy, activity modification in interim.    EMR Dragon/Transciption Disclaimer: Some of this note may be an electronic transcription/translation of spoken language to printed text.  The electronic translation of spoken language may permit erroneous, or at times, nonsensical words or phrases to be inadvertently transcribed. Although I have reviewed the note for such errors, some may still exist.       Return in about 3 weeks (around 3/28/2022) for Surgical consult.      This document has been electronically signed by LUCY Veloz on March 7, 2022 11:00 CST

## 2022-03-19 ENCOUNTER — HOSPITAL ENCOUNTER (OUTPATIENT)
Dept: MRI IMAGING | Facility: HOSPITAL | Age: 37
Discharge: HOME | End: 2022-03-19
Admitting: NURSE PRACTITIONER

## 2022-03-19 DIAGNOSIS — G89.29 CHRONIC RIGHT SHOULDER PAIN: ICD-10-CM

## 2022-03-19 DIAGNOSIS — M24.811 INTERNAL DERANGEMENT OF RIGHT SHOULDER: ICD-10-CM

## 2022-03-19 DIAGNOSIS — M25.511 CHRONIC RIGHT SHOULDER PAIN: ICD-10-CM

## 2022-03-19 DIAGNOSIS — Z87.39: ICD-10-CM

## 2022-03-19 DIAGNOSIS — M19.019 ARTHRITIS OF SHOULDER: ICD-10-CM

## 2022-03-19 PROCEDURE — 73221 MRI JOINT UPR EXTREM W/O DYE: CPT

## 2022-03-31 ENCOUNTER — OFFICE VISIT (OUTPATIENT)
Dept: ORTHOPEDIC SURGERY | Facility: CLINIC | Age: 37
End: 2022-03-31

## 2022-03-31 VITALS — BODY MASS INDEX: 35.94 KG/M2 | WEIGHT: 229 LBS | HEIGHT: 67 IN

## 2022-03-31 DIAGNOSIS — M24.811 INTERNAL DERANGEMENT OF RIGHT SHOULDER: ICD-10-CM

## 2022-03-31 DIAGNOSIS — M19.019 ARTHRITIS OF SHOULDER: ICD-10-CM

## 2022-03-31 DIAGNOSIS — G89.29 CHRONIC RIGHT SHOULDER PAIN: Primary | ICD-10-CM

## 2022-03-31 DIAGNOSIS — M25.511 CHRONIC RIGHT SHOULDER PAIN: Primary | ICD-10-CM

## 2022-03-31 PROCEDURE — 99213 OFFICE O/P EST LOW 20 MIN: CPT | Performed by: ORTHOPAEDIC SURGERY

## 2022-12-16 ENCOUNTER — HOSPITAL ENCOUNTER (OUTPATIENT)
Dept: MRI IMAGING | Facility: HOSPITAL | Age: 37
Discharge: HOME | End: 2022-12-16
Admitting: ORTHOPAEDIC SURGERY

## 2022-12-16 DIAGNOSIS — M25.511 RIGHT SHOULDER PAIN, UNSPECIFIED CHRONICITY: ICD-10-CM

## 2022-12-16 PROCEDURE — 73221 MRI JOINT UPR EXTREM W/O DYE: CPT

## 2023-03-20 ENCOUNTER — TRANSCRIBE ORDERS (OUTPATIENT)
Dept: PHYSICAL THERAPY | Facility: HOSPITAL | Age: 38
End: 2023-03-20
Payer: OTHER GOVERNMENT

## 2023-03-20 DIAGNOSIS — Z98.890 S/P ARTHROSCOPY OF RIGHT SHOULDER: Primary | ICD-10-CM

## 2023-07-25 ENCOUNTER — HOSPITAL ENCOUNTER (OUTPATIENT)
Dept: PHYSICAL THERAPY | Facility: HOSPITAL | Age: 38
Setting detail: THERAPIES SERIES
Discharge: HOME | End: 2023-07-25
Payer: OTHER GOVERNMENT

## 2023-07-25 DIAGNOSIS — Z98.890 S/P ARTHROSCOPY OF RIGHT SHOULDER: Primary | ICD-10-CM

## 2023-07-25 PROCEDURE — 97110 THERAPEUTIC EXERCISES: CPT | Performed by: PHYSICAL THERAPIST
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
## 2023-07-27 ENCOUNTER — HOSPITAL ENCOUNTER (OUTPATIENT)
Dept: PHYSICAL THERAPY | Facility: HOSPITAL | Age: 38
Setting detail: THERAPIES SERIES
Discharge: HOME | End: 2023-07-27
Payer: OTHER GOVERNMENT

## 2023-07-27 DIAGNOSIS — Z98.890 S/P ARTHROSCOPY OF RIGHT SHOULDER: Primary | ICD-10-CM

## 2023-07-27 PROCEDURE — 97110 THERAPEUTIC EXERCISES: CPT
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
## 2023-08-01 ENCOUNTER — HOSPITAL ENCOUNTER (OUTPATIENT)
Dept: PHYSICAL THERAPY | Facility: HOSPITAL | Age: 38
Setting detail: THERAPIES SERIES
Discharge: HOME | End: 2023-08-01
Payer: OTHER GOVERNMENT

## 2023-08-01 DIAGNOSIS — Z96.611 HISTORY OF TOTAL SHOULDER REPLACEMENT, RIGHT: ICD-10-CM

## 2023-08-01 DIAGNOSIS — Z98.890 S/P ARTHROSCOPY OF RIGHT SHOULDER: Primary | ICD-10-CM

## 2023-08-01 PROCEDURE — 97110 THERAPEUTIC EXERCISES: CPT | Performed by: PHYSICAL THERAPIST

## 2023-08-03 ENCOUNTER — HOSPITAL ENCOUNTER (OUTPATIENT)
Dept: PHYSICAL THERAPY | Facility: HOSPITAL | Age: 38
Setting detail: THERAPIES SERIES
Discharge: HOME | End: 2023-08-03
Payer: OTHER GOVERNMENT

## 2023-08-03 DIAGNOSIS — Z96.611 HISTORY OF TOTAL SHOULDER REPLACEMENT, RIGHT: ICD-10-CM

## 2023-08-03 DIAGNOSIS — Z98.890 S/P ARTHROSCOPY OF RIGHT SHOULDER: Primary | ICD-10-CM

## 2023-08-03 PROCEDURE — 97110 THERAPEUTIC EXERCISES: CPT | Performed by: PHYSICAL THERAPIST

## 2023-08-08 ENCOUNTER — HOSPITAL ENCOUNTER (OUTPATIENT)
Dept: PHYSICAL THERAPY | Facility: HOSPITAL | Age: 38
Setting detail: THERAPIES SERIES
Discharge: HOME | End: 2023-08-08
Payer: OTHER GOVERNMENT

## 2023-08-08 DIAGNOSIS — Z96.611 HISTORY OF TOTAL SHOULDER REPLACEMENT, RIGHT: ICD-10-CM

## 2023-08-08 DIAGNOSIS — Z98.890 S/P ARTHROSCOPY OF RIGHT SHOULDER: Primary | ICD-10-CM

## 2023-08-08 PROCEDURE — 97110 THERAPEUTIC EXERCISES: CPT | Performed by: PHYSICAL THERAPIST
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
## 2023-08-10 ENCOUNTER — HOSPITAL ENCOUNTER (OUTPATIENT)
Dept: PHYSICAL THERAPY | Facility: HOSPITAL | Age: 38
Setting detail: THERAPIES SERIES
Discharge: HOME | End: 2023-08-10
Payer: OTHER GOVERNMENT

## 2023-08-10 DIAGNOSIS — Z96.611 HISTORY OF TOTAL SHOULDER REPLACEMENT, RIGHT: ICD-10-CM

## 2023-08-10 DIAGNOSIS — Z98.890 S/P ARTHROSCOPY OF RIGHT SHOULDER: Primary | ICD-10-CM

## 2023-08-10 PROCEDURE — 97110 THERAPEUTIC EXERCISES: CPT | Performed by: PHYSICAL THERAPIST
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
## 2023-08-15 ENCOUNTER — HOSPITAL ENCOUNTER (OUTPATIENT)
Dept: PHYSICAL THERAPY | Facility: HOSPITAL | Age: 38
Setting detail: THERAPIES SERIES
Discharge: HOME | End: 2023-08-15
Payer: OTHER GOVERNMENT

## 2023-08-15 DIAGNOSIS — Z96.611 HISTORY OF TOTAL SHOULDER REPLACEMENT, RIGHT: ICD-10-CM

## 2023-08-15 DIAGNOSIS — Z98.890 S/P ARTHROSCOPY OF RIGHT SHOULDER: Primary | ICD-10-CM

## 2023-08-15 PROCEDURE — 97110 THERAPEUTIC EXERCISES: CPT
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
## 2023-08-17 ENCOUNTER — HOSPITAL ENCOUNTER (OUTPATIENT)
Dept: PHYSICAL THERAPY | Facility: HOSPITAL | Age: 38
Setting detail: THERAPIES SERIES
Discharge: HOME | End: 2023-08-17
Payer: OTHER GOVERNMENT

## 2023-08-17 DIAGNOSIS — Z98.890 S/P ARTHROSCOPY OF RIGHT SHOULDER: Primary | ICD-10-CM

## 2023-08-17 DIAGNOSIS — Z96.611 HISTORY OF TOTAL SHOULDER REPLACEMENT, RIGHT: ICD-10-CM

## 2023-08-17 PROCEDURE — 97110 THERAPEUTIC EXERCISES: CPT | Performed by: PHYSICAL THERAPIST
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
## 2023-08-22 ENCOUNTER — HOSPITAL ENCOUNTER (OUTPATIENT)
Dept: PHYSICAL THERAPY | Facility: HOSPITAL | Age: 38
Setting detail: THERAPIES SERIES
Discharge: HOME | End: 2023-08-22
Payer: OTHER GOVERNMENT

## 2023-08-22 DIAGNOSIS — Z96.611 HISTORY OF TOTAL SHOULDER REPLACEMENT, RIGHT: ICD-10-CM

## 2023-08-22 DIAGNOSIS — Z98.890 S/P ARTHROSCOPY OF RIGHT SHOULDER: Primary | ICD-10-CM

## 2023-08-22 PROCEDURE — 97110 THERAPEUTIC EXERCISES: CPT
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
## 2023-08-24 ENCOUNTER — APPOINTMENT (OUTPATIENT)
Dept: PHYSICAL THERAPY | Facility: HOSPITAL | Age: 38
End: 2023-08-24
Payer: OTHER GOVERNMENT

## 2023-08-29 ENCOUNTER — HOSPITAL ENCOUNTER (OUTPATIENT)
Dept: PHYSICAL THERAPY | Facility: HOSPITAL | Age: 38
Setting detail: THERAPIES SERIES
Discharge: HOME | End: 2023-08-29
Payer: OTHER GOVERNMENT

## 2023-08-29 DIAGNOSIS — Z98.890 S/P ARTHROSCOPY OF RIGHT SHOULDER: Primary | ICD-10-CM

## 2023-08-29 DIAGNOSIS — Z96.611 HISTORY OF TOTAL SHOULDER REPLACEMENT, RIGHT: ICD-10-CM

## 2023-08-29 PROCEDURE — 97110 THERAPEUTIC EXERCISES: CPT | Performed by: PHYSICAL THERAPIST
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
## 2023-08-31 ENCOUNTER — HOSPITAL ENCOUNTER (OUTPATIENT)
Dept: PHYSICAL THERAPY | Facility: HOSPITAL | Age: 38
Setting detail: THERAPIES SERIES
Discharge: HOME | End: 2023-08-31
Payer: OTHER GOVERNMENT

## 2023-08-31 DIAGNOSIS — Z96.611 HISTORY OF TOTAL SHOULDER REPLACEMENT, RIGHT: ICD-10-CM

## 2023-08-31 DIAGNOSIS — Z98.890 S/P ARTHROSCOPY OF RIGHT SHOULDER: Primary | ICD-10-CM

## 2023-08-31 PROCEDURE — 97110 THERAPEUTIC EXERCISES: CPT
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
## 2023-09-06 ENCOUNTER — HOSPITAL ENCOUNTER (OUTPATIENT)
Dept: PHYSICAL THERAPY | Facility: HOSPITAL | Age: 38
Setting detail: THERAPIES SERIES
Discharge: HOME | End: 2023-09-06
Payer: OTHER GOVERNMENT

## 2023-09-06 DIAGNOSIS — Z96.611 HISTORY OF TOTAL SHOULDER REPLACEMENT, RIGHT: ICD-10-CM

## 2023-09-06 DIAGNOSIS — Z98.890 S/P ARTHROSCOPY OF RIGHT SHOULDER: Primary | ICD-10-CM

## 2023-09-06 PROCEDURE — 97110 THERAPEUTIC EXERCISES: CPT
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
## 2023-09-08 ENCOUNTER — HOSPITAL ENCOUNTER (OUTPATIENT)
Dept: PHYSICAL THERAPY | Facility: HOSPITAL | Age: 38
Setting detail: THERAPIES SERIES
Discharge: HOME | End: 2023-09-08
Payer: OTHER GOVERNMENT

## 2023-09-08 DIAGNOSIS — Z98.890 S/P ARTHROSCOPY OF RIGHT SHOULDER: Primary | ICD-10-CM

## 2023-09-08 DIAGNOSIS — Z96.611 HISTORY OF TOTAL SHOULDER REPLACEMENT, RIGHT: ICD-10-CM

## 2023-09-08 PROCEDURE — 97110 THERAPEUTIC EXERCISES: CPT | Performed by: PHYSICAL THERAPIST
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
## 2023-09-12 ENCOUNTER — HOSPITAL ENCOUNTER (OUTPATIENT)
Dept: PHYSICAL THERAPY | Facility: HOSPITAL | Age: 38
Setting detail: THERAPIES SERIES
Discharge: HOME | End: 2023-09-12
Payer: OTHER GOVERNMENT

## 2023-09-12 DIAGNOSIS — Z98.890 S/P ARTHROSCOPY OF RIGHT SHOULDER: Primary | ICD-10-CM

## 2023-09-12 DIAGNOSIS — Z96.611 HISTORY OF TOTAL SHOULDER REPLACEMENT, RIGHT: ICD-10-CM

## 2023-09-12 PROCEDURE — 97110 THERAPEUTIC EXERCISES: CPT
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
## 2023-09-14 ENCOUNTER — HOSPITAL ENCOUNTER (OUTPATIENT)
Dept: PHYSICAL THERAPY | Facility: HOSPITAL | Age: 38
Setting detail: THERAPIES SERIES
Discharge: HOME | End: 2023-09-14
Payer: OTHER GOVERNMENT

## 2023-09-14 DIAGNOSIS — Z98.890 S/P ARTHROSCOPY OF RIGHT SHOULDER: Primary | ICD-10-CM

## 2023-09-14 DIAGNOSIS — Z96.611 HISTORY OF TOTAL SHOULDER REPLACEMENT, RIGHT: ICD-10-CM

## 2023-09-14 PROCEDURE — 97110 THERAPEUTIC EXERCISES: CPT | Performed by: PHYSICAL THERAPIST
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
## 2023-09-19 ENCOUNTER — HOSPITAL ENCOUNTER (OUTPATIENT)
Dept: PHYSICAL THERAPY | Facility: HOSPITAL | Age: 38
Setting detail: THERAPIES SERIES
Discharge: HOME | End: 2023-09-19
Payer: OTHER GOVERNMENT

## 2023-09-19 DIAGNOSIS — Z98.890 S/P ARTHROSCOPY OF RIGHT SHOULDER: Primary | ICD-10-CM

## 2023-09-19 DIAGNOSIS — Z96.611 HISTORY OF TOTAL SHOULDER REPLACEMENT, RIGHT: ICD-10-CM

## 2023-09-19 PROCEDURE — 97530 THERAPEUTIC ACTIVITIES: CPT | Performed by: PHYSICAL THERAPIST

## 2023-09-19 PROCEDURE — 97110 THERAPEUTIC EXERCISES: CPT | Performed by: PHYSICAL THERAPIST
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
## 2023-09-22 ENCOUNTER — HOSPITAL ENCOUNTER (OUTPATIENT)
Dept: PHYSICAL THERAPY | Facility: HOSPITAL | Age: 38
Setting detail: THERAPIES SERIES
Discharge: HOME | End: 2023-09-22
Payer: OTHER GOVERNMENT

## 2023-09-22 DIAGNOSIS — Z96.611 HISTORY OF TOTAL SHOULDER REPLACEMENT, RIGHT: ICD-10-CM

## 2023-09-22 DIAGNOSIS — Z98.890 S/P ARTHROSCOPY OF RIGHT SHOULDER: Primary | ICD-10-CM

## 2023-09-22 PROCEDURE — 97110 THERAPEUTIC EXERCISES: CPT | Performed by: PHYSICAL THERAPIST
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
## 2023-09-26 ENCOUNTER — HOSPITAL ENCOUNTER (OUTPATIENT)
Dept: PHYSICAL THERAPY | Facility: HOSPITAL | Age: 38
Setting detail: THERAPIES SERIES
Discharge: HOME | End: 2023-09-26
Payer: OTHER GOVERNMENT

## 2023-09-26 DIAGNOSIS — Z96.611 HISTORY OF TOTAL SHOULDER REPLACEMENT, RIGHT: ICD-10-CM

## 2023-09-26 DIAGNOSIS — Z98.890 S/P ARTHROSCOPY OF RIGHT SHOULDER: Primary | ICD-10-CM

## 2023-09-26 PROCEDURE — 97110 THERAPEUTIC EXERCISES: CPT | Performed by: PHYSICAL THERAPIST
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
## 2023-09-28 ENCOUNTER — HOSPITAL ENCOUNTER (OUTPATIENT)
Dept: PHYSICAL THERAPY | Facility: HOSPITAL | Age: 38
Setting detail: THERAPIES SERIES
Discharge: HOME | End: 2023-09-28
Payer: OTHER GOVERNMENT

## 2023-09-28 DIAGNOSIS — Z98.890 S/P ARTHROSCOPY OF RIGHT SHOULDER: Primary | ICD-10-CM

## 2023-09-28 DIAGNOSIS — Z96.611 HISTORY OF TOTAL SHOULDER REPLACEMENT, RIGHT: ICD-10-CM

## 2023-09-28 PROCEDURE — 97110 THERAPEUTIC EXERCISES: CPT | Performed by: PHYSICAL THERAPIST
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
## (undated) DEVICE — GLV SURG SENSICARE PI LF PF 7.5 GRN STRL

## (undated) DEVICE — GLV SURG SENSICARE PI LF PF 8 GRN STRL

## (undated) DEVICE — DRAPE,U/ SHT,SPLIT,PLAS,STERIL: Brand: MEDLINE

## (undated) DEVICE — TOWEL,OR,DSP,ST,BLUE,DLX,8/PK,10PK/CS: Brand: MEDLINE

## (undated) DEVICE — TBG PUMP ARTHSCP MAIN AR6400 16FT

## (undated) DEVICE — GLV SURG SENSICARE GREEN W/ALOE PF LF 8 STRL

## (undated) DEVICE — SUT NLY 2/0 664G

## (undated) DEVICE — SHEET,DRAPE,53X77,STERILE: Brand: MEDLINE

## (undated) DEVICE — GLV SURG TRIUMPH ORTHO W/ALOE PF LTX 7.0

## (undated) DEVICE — BLD SHAVER RESECTOR SERR AGGR 5MM 13CM

## (undated) DEVICE — GOWN,AURORA,NOREINF,RAGLAN,XL,STERILE: Brand: MEDLINE

## (undated) DEVICE — SPNG GZ WOVN 4X4IN 12PLY 10/BX STRL

## (undated) DEVICE — PK SHLDR ARTHSCP 60

## (undated) DEVICE — GLV SURG NEOLON 2G PF LF 6.5 STRL

## (undated) DEVICE — SOL IRR LACT RNG BG 3000ML

## (undated) DEVICE — ABL OPES COOLCUT ASPIR 3MM 90D

## (undated) DEVICE — GLV SURG SENSICARE ALOE LF PF SZ7.5 GRN

## (undated) DEVICE — CANN TWST IN W/NOSQUIRT CAP 7IDX7CM

## (undated) DEVICE — Device

## (undated) DEVICE — TP ELAS ELASTIKON ADHS 4IN 2.5YD

## (undated) DEVICE — GLV SURG SENSICARE MICRO PF LF 7.5 STRL

## (undated) DEVICE — GLV SURG SENSICARE PI PF LF 7 GRN STRL

## (undated) DEVICE — STCKNT IMPERV 12IN STRL

## (undated) DEVICE — PAD,ABDOMINAL,8"X10",ST,LF: Brand: MEDLINE

## (undated) DEVICE — BNDG ELAS CO-FLEX SLF ADHR 6IN 5YD LF STRL